# Patient Record
Sex: FEMALE | Race: WHITE | Employment: FULL TIME | URBAN - METROPOLITAN AREA
[De-identification: names, ages, dates, MRNs, and addresses within clinical notes are randomized per-mention and may not be internally consistent; named-entity substitution may affect disease eponyms.]

---

## 2018-05-16 ENCOUNTER — HOSPITAL ENCOUNTER (OUTPATIENT)
Dept: PHYSICAL THERAPY | Age: 58
Discharge: HOME | End: 2018-05-16
Payer: OTHER GOVERNMENT

## 2018-05-16 PROCEDURE — 97140 MANUAL THERAPY 1/> REGIONS: CPT

## 2018-05-16 PROCEDURE — 97162 PT EVAL MOD COMPLEX 30 MIN: CPT

## 2018-05-16 NOTE — PROGRESS NOTES
[address] [facility] [address] [facility] AT [address] [place], [place]  Phone: [phone] Fax: [phone] / [address] [address] PHYSICAL THERAPY SERVICES  Patient Name: Masha Hilliard : 1960   Medical   Diagnosis: Acute postoperative pain of right knee [G89.18, M25.561] Treatment Diagnosis: Acute postoperative pain of right knee [G89.18, M25.561]   Onset Date: DOS:  3/26/2018     Referral Source: Hamida Huerta MD [facility]): 2018   Prior Hospitalization: See medical history Provider #: [id number]   Prior Level of Function: Chronic difficulty/pain with stairs, sleeping, walking due to knee pain; ambulated without AD. Works as realtor full time. Recreational exercises: weight lifting, treadmill, elliptical, bike riding. Comorbidities: asthma   Medications: Verified on Patient Summary List   The Plan of Care and following information is based on the information from the initial evaluation.   ========================================================================  Assessment / key information:  Pt is a 62y.o. year old female with subjective complaints of R knee pain s/p TKA 3/26/2018. Pt received 4 weeks of HHPT, and has had slight delay in referral to outpt PT. Pt states she feels rehab is going well. Reports compliance with HEP. Reports slight numbness to lateral aspect of knee, but denies red flags. Current pain is rated as 2 to 10/10. Current functional limitations: stairs with pain/difficulty descending, walking, standing, unable to return to recreational activities. FOTO score= 46/100 indicating moderate impairment to functional activities.   Today's evaulation is significant for   1) gait mechanics; minimally antalgic RLE with decreased knee extension noted     2) Impaired knee A/PROM: R (-8)/(-7) to 105/110.  3) Impaired strength:  Hip flex R 4/5, L 4+/5; ext R 4-/5; abd R 5-/5; Knee Ext R 5-/5, L 5/5; flex R 4/5    4) Additional findings: mild tightness to ITB/hams/gastroc, moderate tightness to quad. Patellar mobs WNL. Mild edema lateral superior joint. Pt will be a good candidate for skilled PT to address these impairments and promote return to normal ADLs and functional mobility for improved quality of life.    ========================================================================  Eval Complexity: History: MEDIUM  Complexity : 1-2 comorbidities / personal factors will impact the outcome/ POC Exam:MEDIUM Complexity : 3 Standardized tests and measures addressing body structure, function, activity limitation and / or participation in recreation  Presentation: MEDIUM Complexity : Evolving with changing characteristics  Clinical Decision Making:MEDIUM Complexity : FOTO score of 26-74Overall Complexity:MEDIUM  Problem List: pain affecting function, decrease ROM, decrease strength, impaired gait/ balance, decrease ADL/ functional abilitiies, decrease activity tolerance, decrease flexibility/ joint mobility and decrease transfer abilities   Treatment Plan may include any combination of the following: Therapeutic exercise, Therapeutic activities, Neuromuscular re-education, Physical agent/modality, Gait/balance training, Manual therapy, Patient education, Functional mobility training and Stair training  Patient / Family readiness to learn indicated by: asking questions, trying to perform skills and interest  Persons(s) to be included in education: patient (P)  Barriers to Learning/Limitations: None  Measures taken:    Patient Goal (s): \"full extension & flexion of right knee return to normal activities to include long walks, stair climbing, weight lifting, bicycling, eliptical\"\"   Patient self reported health status: good  Rehabilitation Potential: good   Short Term Goals: To be accomplished in  2-3  weeks:  1.   Pt will be educated in appropriate HEP to decrease pain, increase ROM and return pt to PLOF.    2. Pt will increase R knee AROM 0 to 120 in order to promote normalized gait mechanics  3. Pt will improve FOTO score to >/= 56 to demonstrate a significant improvement in functional activity tolerance.  Long Term Goals: To be accomplished in  6-8  weeks:  1. Pt will improve FOTO score to >/= 66 to demo a significant improvement in functional activity tolerance. 2. Patient will demonstrate good eccentric quad control in lateral step down off 6 inch step with no compensation to facilitate normalized gait pattern for stair negotiation. 3. Patient will increase R knee strength in flexion to >/= 4+/5 so patient has improved ability to return to recreational sport activities. Frequency / Duration:   Patient to be seen  2-3  times per week for 6-8  weeks:  Patient / Caregiver education and instruction: activity modification and exercises  G-Codes (GP): n/a  Therapist Signature: Bart Sol, PT Date: 3/32/9246   Certification Period: n/a Time: 7:06 AM   ========================================================================  I certify that the above Physical Therapy Services are being furnished while the patient is under my care. I agree with the treatment plan and certify that this therapy is necessary. Physician Signature:        Date:       Time:   Please sign and return to [facility] or you may fax the signed copy to [phone]. Thank you.

## 2018-05-16 NOTE — PROGRESS NOTES
PT DAILY TREATMENT NOTE     Patient Name: Beck Grajeda  Date:2018  : 1960  [x]  Patient  Verified  Payor: SAI / Plan: Pravin Swain 74 / Product Type:  /    In time: 10:00  Out time: 1040  Total Treatment Time (min): 40  Total Timed Codes (min): 40  1:1 Treatment Time (min): n/a   Visit #: 1     Treatment Area: Acute postoperative pain of right knee [G89.18, M25.561]    SUBJECTIVE  Pain Level (0-10 scale): 2-3  Any medication changes, allergies to medications, adverse drug reactions, diagnosis change, or new procedure performed?: [x] No    [] Yes (see summary sheet for update)  Subjective functional status/changes:   [] No changes reported  See POC    OBJECTIVE    See exam on chart for details on objective findings          5 min Therapeutic Exercise:  [x] See flow sheet ; reviewed current HEP and advised to add stair knee flexion stretch and static sitting extension stretch with use of bag for additional weight/stretch x 5 minutes 2-3 x/day. Rationale: increase ROM and increase strength to improve the patients ability to perform functional mobility/ADLs and attain goals. 15 min Manual Therapy:  IASTM to distal quads, ITB. DTM to distal hamstrings. PROM knee flex/ext. Grade 3-4 mobs for knee extension. Manual quad stretch with CR. Rationale: decrease pain, increase ROM, increase tissue extensibility and decrease trigger points to perform functional mobility/ADLs and attain goals.                min Patient Education: [x] Review HEP    [] Progressed/Changed HEP based on:   [] positioning   [] body mechanics   [] transfers   [x] heat/ice application        Other Objective/Functional Measures:     Pain Level (0-10 scale) post treatment: 3-4    ASSESSMENT/Changes in Function:     Patient will continue to benefit from skilled PT services to modify and progress therapeutic interventions, address functional mobility deficits, address ROM deficits, address strength deficits and analyze and address soft tissue restrictions to attain remaining goals. [x]  See Plan of Care  []  See progress note/recertification  []  See Discharge Summary         Progress towards goals / Updated goals:  See POC    PLAN  [x]  Upgrade activities as tolerated     [x]  Continue plan of care  []  Update interventions per flow sheet       []  Discharge due to:_  []  Other:_      Martina Sol, PT 5/16/2018  7:06 AM

## 2018-05-21 ENCOUNTER — HOSPITAL ENCOUNTER (OUTPATIENT)
Dept: PHYSICAL THERAPY | Age: 58
Discharge: HOME | End: 2018-05-21
Payer: OTHER GOVERNMENT

## 2018-05-21 PROCEDURE — 97110 THERAPEUTIC EXERCISES: CPT

## 2018-05-21 PROCEDURE — 97140 MANUAL THERAPY 1/> REGIONS: CPT

## 2018-05-21 NOTE — PROGRESS NOTES
PT DAILY TREATMENT NOTE     Patient Name: Masha Hilliard  Date:2018  : 1960  [x]  Patient  Verified  Payor:  / Plan: Pravin Swain 74 / Product Type:  /    In time: 8:32  Out time: 9:38  Total Treatment Time (min): 66  Total Timed Codes (min): 51  1:1 Treatment Time (min): n/a   Visit #: 2 of     Treatment Area: Acute postoperative pain of right knee [G89.18, M25.561]    SUBJECTIVE  Pain Level (0-10 scale): 1-2  Any medication changes, allergies to medications, adverse drug reactions, diagnosis change, or new procedure performed?: [x] No    [] Yes (see summary sheet for update)  Subjective functional status/changes:   [] No changes reported  \"I don't know why but it felt really stiff all weekend\"    OBJECTIVE  Modality rationale: decrease inflammation and decrease pain to improve the patients ability to perform functional mobility/ADLs and attain goals.    Min Type Additional Details    [] Estim: []Att   []Unatt        []TENS instruct                  []IFC  []Premod   []NMES                     []Other:  []w/US   []w/ice   []w/heat  Position:  Location:    []  Traction: [] Cervical       []Lumbar                       [] Prone          []Supine                       []Intermittent   []Continuous Lbs:  [] before manual  [] after manual    []  Ultrasound: []Continuous   [] Pulsed                           []1MHz   []3MHz Location:  W/cm2:    []  Iontophoresis with dexamethasone         Location: [] Take home patch   [] In clinic   10 [x]  Ice     []  heat  []  Ice massage Position: reclined  Location: R knee    []  Vasopneumatic Device Pressure:       [] lo [] med [] hi   Temperature: [] lo [] med [] hi   [x] Skin assessment post-treatment:  [x]intact []redness- no adverse reaction       []redness - adverse reaction:       38 min Therapeutic Exercise:  [x] See flow sheet :   Rationale: increase ROM and increase strength to improve the patients ability to perform functional mobility/ADLs and attain goals. 13 min Manual Therapy:  STM to distal quads, ITB. DTM to distal hamstrings. PROM knee flex/ext. Grade 3-4 mobs for knee extension. Rationale:  decrease pain, increase ROM, increase tissue extensibility and decrease trigger points to perform functional mobility/ADLs and attain goals. min Patient Education: [x] Review HEP    [] Progressed/Changed HEP based on:   [] positioning   [] body mechanics   [] transfers   [] heat/ice application        Other Objective/Functional Measures:   -new exercises added as per flow sheet with vc's provided for form/technique 100% of the time. Pain Level (0-10 scale) post treatment:  2-3    ASSESSMENT/Changes in Function: Pt with increased knee A/PROM this session vs. SOC. Pt with minimal muscle guarding during manual, however with cueing able to reduce and achieve good stretching. Pt with no adverse signs/sx's with today's session. VMO preference with exercises to promote restored balance of quads. Patient will continue to benefit from skilled PT services to modify and progress therapeutic interventions, address functional mobility deficits, address ROM deficits, address strength deficits and analyze and address soft tissue restrictions to attain remaining goals. Progress towards goals / Updated goals:  Initiated exercises this session    PLAN  [x]  Upgrade activities as tolerated     [x]  Continue plan of care  []  Update interventions per flow sheet       []  Discharge due to:_  []  Other:_      Michelle Sol, PT 5/21/2018  7:12 AM

## 2018-05-23 ENCOUNTER — HOSPITAL ENCOUNTER (OUTPATIENT)
Dept: PHYSICAL THERAPY | Age: 58
Discharge: HOME | End: 2018-05-23
Payer: OTHER GOVERNMENT

## 2018-05-23 PROCEDURE — 97140 MANUAL THERAPY 1/> REGIONS: CPT

## 2018-05-23 PROCEDURE — 97110 THERAPEUTIC EXERCISES: CPT

## 2018-05-23 PROCEDURE — 97016 VASOPNEUMATIC DEVICE THERAPY: CPT

## 2018-05-23 NOTE — PROGRESS NOTES
PT DAILY TREATMENT NOTE     Patient Name: Aleah Pringle  Date:2018  : 1960  [x]  Patient  Verified  Payor: SAI / Plan: Edgard Dickinson / Product Type:  /    In time: 230  Out time: 344  Total Treatment Time (min): 74  Total Timed Codes (min): 64  1:1 Treatment Time (min): n/a   Visit #: 3 of     Treatment Area: Acute postoperative pain of right knee [G89.18, M25.561]    SUBJECTIVE  Pain Level (0-10 scale): 2-3/10 \"its so tight\"  Any medication changes, allergies to medications, adverse drug reactions, diagnosis change, or new procedure performed?: [x] No    [] Yes (see summary sheet for update)  Subjective functional status/changes:   [] No changes reported  My right knee is swollen and is very tight. I cant wait till its back to normal. Its much better though than it was before surgery. OBJECTIVE  Modality rationale: decrease inflammation and decrease pain to improve the patients ability to perform functional mobility/ADLs and attain goals.    Min Type Additional Details    [] Estim: []Att   []Unatt        []TENS instruct                  []IFC  []Premod   []NMES                     []Other:  []w/US   []w/ice   []w/heat  Position:  Location:    []  Traction: [] Cervical       []Lumbar                       [] Prone          []Supine                       []Intermittent   []Continuous Lbs:  [] before manual  [] after manual    []  Ultrasound: []Continuous   [] Pulsed                           []1MHz   []3MHz Location:  W/cm2:    []  Iontophoresis with dexamethasone         Location: [] Take home patch   [] In clinic    []  Ice     []  heat  []  Ice massage Position: reclined  Location: R knee   10 [x]  Vasopneumatic Device to the right knee Pressure:       [] lo [x] med [] hi   Temperature: [x] lo [] med [] hi   [x] Skin assessment post-treatment:  [x]intact []redness- no adverse reaction       []redness - adverse reaction:       49 min Therapeutic Exercise:  [x] See flow sheet :   Rationale: increase ROM and increase strength to improve the patients ability to perform functional mobility/ADLs and attain goals. 15 min Manual Therapy:  STM to distal quads, ITB, TFL, and TPR to the right popliteus; grade III tibiofemoral mobes into extension and cranial PF glides    Rationale:  decrease pain, increase ROM, increase tissue extensibility and decrease trigger points to perform functional mobility/ADLs and attain goals. min Patient Education: [x] Review HEP    [] Progressed/Changed HEP based on:   [] positioning   [] body mechanics   [] transfers   [] heat/ice application        Other Objective/Functional Measures:   Prior to manual:  Circumferential measures at superior patella poles L 33cm R 35cm  Right knee extension -10 degrees   left knee extension -5 degrees  Active flexion in supine R 105 degrees and L 130 degrees    Pain Level (0-10 scale) post treatment:  1    ASSESSMENT/Changes in Function:   Pt lacking 10 degrees of right knee extension prior to manual therapy and 5 degrees after mobes and stretching. Pt with poor tolerance to prone right knee flexion stretch and with active muscle guarding. Decreased guarding after mobes and TPR of popliteus. Pt able to complete full revolutions on the recumbent bike today without any pain. Patient will continue to benefit from skilled PT services to modify and progress therapeutic interventions, address functional mobility deficits, address ROM deficits, address strength deficits and analyze and address soft tissue restrictions to attain remaining goals. Progress towards goals / Updated goals: · Short Term Goals: To be accomplished in  2-3  weeks:  1. Pt will be educated in appropriate HEP to decrease pain, increase ROM and return pt to PLOF. -PROGRESSING 5/23/18  2. Pt will increase R knee AROM 0 to 120 in order to promote normalized gait mechanics  3.  Pt will improve FOTO score to >/= 56 to demonstrate a significant improvement in functional activity tolerance.     · Long Term Goals: To be accomplished in  6-8  weeks:  1. Pt will improve FOTO score to >/= 66 to demo a significant improvement in functional activity tolerance. 2. Patient will demonstrate good eccentric quad control in lateral step down off 6 inch step with no compensation to facilitate normalized gait pattern for stair negotiation. 3. Patient will increase R knee strength in flexion to >/= 4+/5 so patient has improved ability to return to recreational sport activities.     PLAN  [x]  Upgrade activities as tolerated     [x]  Continue plan of care  []  Update interventions per flow sheet       []  Discharge due to:_  []  Other:_      Musa Carrizales PT 5/23/2018  7:12 AM

## 2018-05-25 ENCOUNTER — HOSPITAL ENCOUNTER (OUTPATIENT)
Dept: PHYSICAL THERAPY | Age: 58
Discharge: HOME | End: 2018-05-25
Payer: OTHER GOVERNMENT

## 2018-05-25 PROCEDURE — 97140 MANUAL THERAPY 1/> REGIONS: CPT

## 2018-05-25 NOTE — PROGRESS NOTES
PT DAILY TREATMENT NOTE     Patient Name: Zahida Barbosa  Date:2018  : 1960  [x]  Patient  Verified  Payor:  / Plan: Pravin Swain 74 / Product Type:  /    In time: 800  Out time: 850  Total Treatment Time (min): 50  Total Timed Codes (min): 30  1:1 Treatment Time (min):   Visit #: 4 of     Treatment Area: Acute postoperative pain of right knee [G89.18, M25.561]    SUBJECTIVE  Pain Level (0-10 scale): 1-2/10 \"its so tight\"  Any medication changes, allergies to medications, adverse drug reactions, diagnosis change, or new procedure performed?: [x] No    [] Yes (see summary sheet for update)  Subjective functional status/changes:   [] No changes reported  My right knee is always tight especially at this time in the morning. OBJECTIVE  Modality rationale: decrease inflammation and decrease pain to improve the patients ability to perform functional mobility/ADLs and attain goals.    Min Type Additional Details    [] Estim: []Att   []Unatt        []TENS instruct                  []IFC  []Premod   []NMES                     []Other:  []w/US   []w/ice   []w/heat  Position:  Location:    []  Traction: [] Cervical       []Lumbar                       [] Prone          []Supine                       []Intermittent   []Continuous Lbs:  [] before manual  [] after manual    []  Ultrasound: []Continuous   [] Pulsed                           []1MHz   []3MHz Location:  W/cm2:    []  Iontophoresis with dexamethasone         Location: [] Take home patch   [] In clinic    []  Ice     []  heat  []  Ice massage Position: reclined  Location: R knee   10 [x]  Vasopneumatic Device to the right knee Pressure:       [] lo [x] med [] hi   Temperature: [x] lo [] med [] hi   [x] Skin assessment post-treatment:  [x]intact []redness- no adverse reaction       []redness - adverse reaction:       (5)  10min untimed bike for Warm up min Therapeutic Exercise:  [x] See flow sheet :   Rationale: increase ROM and increase strength to improve the patients ability to perform functional mobility/ADLs and attain goals. 25 min Manual Therapy:  STM to distal quads, ITB, TFL, and TPR to the right popliteus; grade III tibiofemoral mobes into flexion and caudal and medial PF glides; tibiofemoral distraction EOB with passive stretching into knee flexion   Rationale:  decrease pain, increase ROM, increase tissue extensibility and decrease trigger points to perform functional mobility/ADLs and attain goals. min Patient Education: [x] Review HEP    [] Progressed/Changed HEP based on:   [] positioning   [] body mechanics   [] transfers   [] heat/ice application        Other Objective/Functional Measures:   Prior to manual:  Circumferential measures at superior patella poles L 33cm R 35cm  Right knee extension -10 degrees   left knee extension -5 degrees  Active flexion in supine R 105 degrees and L 130 degrees     Pain Level (0-10 scale) post treatment:  1    ASSESSMENT/Changes in Function:   Pt with poor tolerance to prone right knee flexion stretch and with active muscle guarding. Decreased guarding after mobes and TPR with passive stretching into flexion with joint distraction. Pt able to complete full revolutions on the recumbent bike today as a warm up without pain. Patient will continue to benefit from skilled PT services to modify and progress therapeutic interventions, address functional mobility deficits, address ROM deficits, address strength deficits and analyze and address soft tissue restrictions to attain remaining goals. Progress towards goals / Updated goals: · Short Term Goals: To be accomplished in  2-3  weeks:  1. Pt will be educated in appropriate HEP to decrease pain, increase ROM and return pt to PLOF. -PROGRESSING 5/23/18  2. Pt will increase R knee AROM 0 to 120 in order to promote normalized gait mechanics-PROGRESSING 0-105 in supine 5/25/18  3.  Pt will improve FOTO score to >/= 56 to demonstrate a significant improvement in functional activity tolerance.     · Long Term Goals: To be accomplished in  6-8  weeks:  1. Pt will improve FOTO score to >/= 66 to demo a significant improvement in functional activity tolerance. 2. Patient will demonstrate good eccentric quad control in lateral step down off 6 inch step with no compensation to facilitate normalized gait pattern for stair negotiation. 3. Patient will increase R knee strength in flexion to >/= 4+/5 so patient has improved ability to return to recreational sport activities.     PLAN  [x]  Upgrade activities as tolerated     [x]  Continue plan of care  []  Update interventions per flow sheet       []  Discharge due to:_  []  Other:_      Alexx Herman, PT 5/25/2018  7:12 AM

## 2018-05-29 ENCOUNTER — HOSPITAL ENCOUNTER (OUTPATIENT)
Dept: PHYSICAL THERAPY | Age: 58
Discharge: HOME | End: 2018-05-29
Payer: OTHER GOVERNMENT

## 2018-05-29 PROCEDURE — 97110 THERAPEUTIC EXERCISES: CPT | Performed by: PHYSICAL THERAPIST

## 2018-05-29 PROCEDURE — 97140 MANUAL THERAPY 1/> REGIONS: CPT | Performed by: PHYSICAL THERAPIST

## 2018-05-29 NOTE — PROGRESS NOTES
PT DAILY TREATMENT NOTE     Patient Name: Leandro Hammans  Date:2018  : 1960  [x]  Patient  Verified  Payor:  / Plan: Pravin Swain 74 / Product Type:  /    In time: 302   Out time: 400pm  Total Treatment Time (min): 58  Total Timed Codes (min): 48  1:1 Treatment Time (min):   Visit #: 5 of     Treatment Area: Acute postoperative pain of right knee [G89.18, M25.561]    SUBJECTIVE  Pain Level (0-10 scale): 1-2/10   Any medication changes, allergies to medications, adverse drug reactions, diagnosis change, or new procedure performed?: [x] No    [] Yes (see summary sheet for update)  Subjective functional status/changes:   [] No changes reported  \" I was able to walk over 5 mi on Sat and . \"    OBJECTIVE  Modality rationale: decrease inflammation and decrease pain to improve the patients ability to perform functional mobility/ADLs and attain goals.    Min Type Additional Details    [] Estim: []Att   []Unatt        []TENS instruct                  []IFC  []Premod   []NMES                     []Other:  []w/US   []w/ice   []w/heat  Position:  Location:    []  Traction: [] Cervical       []Lumbar                       [] Prone          []Supine                       []Intermittent   []Continuous Lbs:  [] before manual  [] after manual    []  Ultrasound: []Continuous   [] Pulsed                           []1MHz   []3MHz Location:  W/cm2:    []  Iontophoresis with dexamethasone         Location: [] Take home patch   [] In clinic   10 [x]  Ice     []  heat  []  Ice massage Position: reclined  Location: R knee    [x]  Vasopneumatic Device to the right knee Pressure:       [] lo [x] med [] hi   Temperature: [x] lo [] med [] hi   [x] Skin assessment post-treatment:  [x]intact []redness- no adverse reaction       []redness - adverse reaction:       33 min Therapeutic Exercise:  [x] See flow sheet :   Rationale: increase ROM and increase strength to improve the patients ability to perform functional mobility/ADLs and attain goals. 15 min Manual Therapy:   ITB, lateral HS ; grade III tibiofemoral mobes ,PF glides; jt distraction EOB with passive stretching into knee flexion   Rationale:  decrease pain, increase ROM, increase tissue extensibility and decrease trigger points to perform functional mobility/ADLs and attain goals. x min Patient Education: [x] Review HEP    [] Progressed/Changed HEP based on:   [] positioning   [x] body mechanics   [] transfers   [] heat/ice application        Other Objective/Functional Measures:   R knee AROM/PROM: 116/120deg after manual, good form noted with eccentric step downs from 6 in. Instructed patient in TKE stretches with weights for prolonged stretch at home     Pain Level (0-10 scale) post treatment:  2    ASSESSMENT/Changes in Function:   Pt with poor tolerance to prone right knee flexion stretch and with active muscle guarding. Decreased guarding after mobes and TPR with passive stretching into flexion with joint distraction. Pt able to complete full revolutions on the recumbent bike today as a warm up without pain. Patient will continue to benefit from skilled PT services to modify and progress therapeutic interventions, address functional mobility deficits, address ROM deficits, address strength deficits and analyze and address soft tissue restrictions to attain remaining goals. Progress towards goals / Updated goals: · Short Term Goals: To be accomplished in  2-3  weeks:  1. Pt will be educated in appropriate HEP to decrease pain, increase ROM and return pt to PLOF. -PROGRESSING 5/23/18  2. Pt will increase R knee AROM 0 to 120 in order to promote normalized gait mechanics-PROGRESSING 0-105 in supine 5/25/18  3. Pt will improve FOTO score to >/= 56 to demonstrate a significant improvement in functional activity tolerance.     · Long Term Goals: To be accomplished in  6-8  weeks:  1.  Pt will improve FOTO score to >/= 66 to demo a significant improvement in functional activity tolerance. 2. Patient will demonstrate good eccentric quad control in lateral step down off 6 inch step with no compensation to facilitate normalized gait pattern for stair negotiation. Progressing able to do with good form today. 5-29-18  3. Patient will increase R knee strength in flexion to >/= 4+/5 so patient has improved ability to return to recreational sport activities.     PLAN  [x]  Upgrade activities as tolerated     [x]  Continue plan of care  []  Update interventions per flow sheet       []  Discharge due to:_  []  Other:_      Ashu Aviles, RADHA 5/29/2018  7:12 AM

## 2018-05-30 ENCOUNTER — HOSPITAL ENCOUNTER (OUTPATIENT)
Dept: PHYSICAL THERAPY | Age: 58
Discharge: HOME | End: 2018-05-30
Payer: OTHER GOVERNMENT

## 2018-05-30 PROCEDURE — 97140 MANUAL THERAPY 1/> REGIONS: CPT

## 2018-05-30 PROCEDURE — 97110 THERAPEUTIC EXERCISES: CPT

## 2018-06-01 ENCOUNTER — HOSPITAL ENCOUNTER (OUTPATIENT)
Dept: PHYSICAL THERAPY | Age: 58
Discharge: HOME | End: 2018-06-01
Payer: OTHER GOVERNMENT

## 2018-06-01 PROCEDURE — 97110 THERAPEUTIC EXERCISES: CPT

## 2018-06-01 PROCEDURE — 97140 MANUAL THERAPY 1/> REGIONS: CPT

## 2018-06-01 NOTE — PROGRESS NOTES
PT DAILY TREATMENT NOTE     Patient Name: Beck Grajeda  Date:2018  : 1960  [x]  Patient  Verified  Payor: SAI / Plan: Pravin Swain 74 / Product Type:  /    In time: 10:26   Out time: 11:36  Total Treatment Time (min): 70  Total Timed Codes (min): 55  1:1 Treatment Time (min):   Visit #: 7     Treatment Area: Acute postoperative pain of right knee [G89.18, M25.561]    SUBJECTIVE  Pain Level (0-10 scale): 2-310   Any medication changes, allergies to medications, adverse drug reactions, diagnosis change, or new procedure performed?: [x] No    [] Yes (see summary sheet for update)  Subjective functional status/changes:   [] No changes reported  \"I really liked what we did the other day. I've been having my  help me stretch my knees straight\". OBJECTIVE  Modality rationale: decrease inflammation and decrease pain to improve the patients ability to perform functional mobility/ADLs and attain goals.    Min Type Additional Details    [] Estim: []Att   []Unatt        []TENS instruct                  []IFC  []Premod   []NMES                     []Other:  []w/US   []w/ice   []w/heat  Position:  Location:    []  Traction: [] Cervical       []Lumbar                       [] Prone          []Supine                       []Intermittent   []Continuous Lbs:  [] before manual  [] after manual    []  Ultrasound: []Continuous   [] Pulsed                           []1MHz   []3MHz Location:  W/cm2:    []  Iontophoresis with dexamethasone         Location: [] Take home patch   [] In clinic   10 [x]  Ice     []  heat  []  Ice massage Position: reclined  Location: R knee    []  Vasopneumatic Device to the right knee Pressure:       [] lo [] med [] hi   Temperature: [] lo [] med [] hi   [x] Skin assessment post-treatment:  [x]intact []redness- no adverse reaction       []redness - adverse reaction:       40 min Therapeutic Exercise:  [x] See flow sheet :   Rationale: increase ROM and increase strength to improve the patients ability to perform functional mobility/ADLs and attain goals. 15 min Manual Therapy:   DTM lateral HS ; grade III tibiofemoral mobes, PF glides; PROM knee flex/ext   Rationale:  decrease pain, increase ROM, increase tissue extensibility and decrease trigger points to perform functional mobility/ADLs and attain goals. x min Patient Education: [x] Review HEP    [] Progressed/Changed HEP based on:   [] positioning   [x] body mechanics   [] transfers   [] heat/ice application        Other Objective/Functional Measures:   -pt requests d/c FR ITB due to difficult for upper body strength; attempted alternate ITB stretches, however pt unable to find a good stretch  -added H/T raises; TKE      Pain Level (0-10 scale) post treatment:  1-2    ASSESSMENT/Changes in Function:   Continues to lack full extension and ed on need for continued HEP stretches to promote normalized gait and stability. Pt c/o crepitus with loading phase of gait; after TKE and cues for increased quad activation crepitus abolished. Patient will continue to benefit from skilled PT services to modify and progress therapeutic interventions, address functional mobility deficits, address ROM deficits, address strength deficits and analyze and address soft tissue restrictions to attain remaining goals. Progress towards goals / Updated goals: · Short Term Goals: To be accomplished in  2-3  weeks:  1. Pt will be educated in appropriate HEP to decrease pain, increase ROM and return pt to PLOF. -PROGRESSING 5/23/18  2. Pt will increase R knee AROM 0 to 120 in order to promote normalized gait mechanics-PROGRESSING 0-105 in supine 5/25/18  3. Pt will improve FOTO score to >/= 56 to demonstrate a significant improvement in functional activity tolerance.     · Long Term Goals: To be accomplished in  6-8  weeks:  1.  Pt will improve FOTO score to >/= 66 to demo a significant improvement in functional activity tolerance. 2. Patient will demonstrate good eccentric quad control in lateral step down off 6 inch step with no compensation to facilitate normalized gait pattern for stair negotiation. Progressing able to do with good form today. 5-29-18  3. Patient will increase R knee strength in flexion to >/= 4+/5 so patient has improved ability to return to recreational sport activities. PLAN  [x]  Upgrade activities as tolerated     [x]  Continue plan of care  []  Update interventions per flow sheet       []  Discharge due to:_  []  Other:_      Mary Kay Sol, PT 6/1/2018  7:12 AM

## 2018-06-04 ENCOUNTER — HOSPITAL ENCOUNTER (OUTPATIENT)
Dept: PHYSICAL THERAPY | Age: 58
Discharge: HOME | End: 2018-06-04
Payer: OTHER GOVERNMENT

## 2018-06-04 PROCEDURE — 97110 THERAPEUTIC EXERCISES: CPT

## 2018-06-04 PROCEDURE — 97140 MANUAL THERAPY 1/> REGIONS: CPT

## 2018-06-04 NOTE — PROGRESS NOTES
PT DAILY TREATMENT NOTE     Patient Name: Annabel Magaña  Date:2018  : 1960  [x]  Patient  Verified  Payor:  / Plan: Tawny Alexandra / Product Type:  /    In time: 1:00   Out time: 2:07 pm  Total Treatment Time (min): 67  Total Timed Codes (min): 52  1:1 Treatment Time (min):   Visit #: 8 of     Treatment Area: Acute postoperative pain of right knee [G89.18, M25.561]    SUBJECTIVE  Pain Level (0-10 scale): 2/10   Any medication changes, allergies to medications, adverse drug reactions, diagnosis change, or new procedure performed?: [x] No    [] Yes (see summary sheet for update)  Subjective functional status/changes:   [] No changes reported  \"The knee feels stiff. I didn't sleep well last night so I don't feel great\"    OBJECTIVE  Modality rationale: decrease inflammation and decrease pain to improve the patients ability to perform functional mobility/ADLs and attain goals.    Min Type Additional Details    [] Estim: []Att   []Unatt        []TENS instruct                  []IFC  []Premod   []NMES                     []Other:  []w/US   []w/ice   []w/heat  Position:  Location:    []  Traction: [] Cervical       []Lumbar                       [] Prone          []Supine                       []Intermittent   []Continuous Lbs:  [] before manual  [] after manual    []  Ultrasound: []Continuous   [] Pulsed                           []1MHz   []3MHz Location:  W/cm2:    []  Iontophoresis with dexamethasone         Location: [] Take home patch   [] In clinic   10 [x]  Ice     []  heat  []  Ice massage Position: reclined  Location: R knee    []  Vasopneumatic Device to the right knee Pressure:       [] lo [] med [] hi   Temperature: [] lo [] med [] hi   [x] Skin assessment post-treatment:  [x]intact []redness- no adverse reaction       []redness - adverse reaction:       38 min Therapeutic Exercise:  [x] See flow sheet (-5 min warm up bike)   Rationale: increase ROM and increase strength to improve the patients ability to perform functional mobility/ADLs and attain goals. 14 min Manual Therapy:   IASTM to VL, ITB, quad tendon and RF.  DTM lateral HS; grade III tibiofemoral mobes, PF glides; PROM knee flex/ext   Rationale:  decrease pain, increase ROM, increase tissue extensibility and decrease trigger points to perform functional mobility/ADLs and attain goals. x min Patient Education: [x] Review HEP    [] Progressed/Changed HEP based on:   [] positioning   [x] body mechanics   [] transfers   [] heat/ice application        Other Objective/Functional Measures:   -increased reps with SAQ  Knee A/PROM post manual and exercises:  (-7)/(-5) to 115/118    Pain Level (0-10 scale) post treatment:  1    ASSESSMENT/Changes in Function:   Slight decrease in knee extension ROM this session vs. 5/30/18; pt advised to expect variation from day to day and to continue with HEP stretching daily for overall trend and progression towards goals. Pt demonstrates good knee stability with BOSU step ups. Min/moderate fatigue reported towards end of reps all exercises indicating appropriate intensity. Patient will continue to benefit from skilled PT services to modify and progress therapeutic interventions, address functional mobility deficits, address ROM deficits, address strength deficits and analyze and address soft tissue restrictions to attain remaining goals. Progress towards goals / Updated goals: · Short Term Goals: To be accomplished in  2-3  weeks:  1. Pt will be educated in appropriate HEP to decrease pain, increase ROM and return pt to PLOF. -6/4: Goal met and ongoing  2. Pt will increase R knee AROM 0 to 120 in order to promote normalized gait mechanics-PROGRESSING (-7)/(-5) to 115/118 in supine 6/4/18  3. Pt will improve FOTO score to >/= 56 to demonstrate a significant improvement in functional activity tolerance.     · Long Term Goals:  To be accomplished in 6-8  weeks:  1. Pt will improve FOTO score to >/= 66 to demo a significant improvement in functional activity tolerance. 2. Patient will demonstrate good eccentric quad control in lateral step down off 6 inch step with no compensation to facilitate normalized gait pattern for stair negotiation. -6/4: Goal in progress; minimal jairo-pelvic drop during lateral tap down. 3. Patient will increase R knee strength in flexion to >/= 4+/5 so patient has improved ability to return to recreational sport activities. PLAN  [x]  Upgrade activities as tolerated     [x]  Continue plan of care  []  Update interventions per flow sheet       []  Discharge due to:_  []  Other:_      Grady Sol, PT 6/4/2018  7:12 AM

## 2018-06-06 ENCOUNTER — APPOINTMENT (OUTPATIENT)
Dept: PHYSICAL THERAPY | Age: 58
End: 2018-06-06
Payer: OTHER GOVERNMENT

## 2018-06-08 ENCOUNTER — HOSPITAL ENCOUNTER (OUTPATIENT)
Dept: PHYSICAL THERAPY | Age: 58
End: 2018-06-08
Payer: OTHER GOVERNMENT

## 2018-06-11 ENCOUNTER — HOSPITAL ENCOUNTER (OUTPATIENT)
Dept: PHYSICAL THERAPY | Age: 58
Discharge: HOME | End: 2018-06-11
Payer: OTHER GOVERNMENT

## 2018-06-11 PROCEDURE — 97140 MANUAL THERAPY 1/> REGIONS: CPT

## 2018-06-11 PROCEDURE — 97110 THERAPEUTIC EXERCISES: CPT

## 2018-06-11 NOTE — PROGRESS NOTES
PT DAILY TREATMENT NOTE     Patient Name: Teofilo Pierce  Date:2018  : 1960  [x]  Patient  Verified  Payor:  / Plan: Pravin Swain 74 / Product Type:  /    In time:   Out time: 111  Total Treatment Time (min): 79  Visit #: 9     Treatment Area: Acute postoperative pain of right knee [G89.18, M25.561]    SUBJECTIVE  Pain Level (0-10 scale): 210   Any medication changes, allergies to medications, adverse drug reactions, diagnosis change, or new procedure performed?: [x] No    [] Yes (see summary sheet for update)  Subjective functional status/changes:   [] No changes reported  \"I still can't get that full extension, but I feel like its getting better. \"    OBJECTIVE  Modality rationale: decrease inflammation and decrease pain to improve the patients ability to perform functional mobility/ADLs and attain goals.    Min Type Additional Details    [] Estim: []Att   []Unatt        []TENS instruct                  []IFC  []Premod   []NMES                     []Other:  []w/US   []w/ice   []w/heat  Position:  Location:    []  Traction: [] Cervical       []Lumbar                       [] Prone          []Supine                       []Intermittent   []Continuous Lbs:  [] before manual  [] after manual    []  Ultrasound: []Continuous   [] Pulsed                           []1MHz   []3MHz Location:  W/cm2:    []  Iontophoresis with dexamethasone         Location: [] Take home patch   [] In clinic   10 [x]  Ice     []  heat  []  Ice massage Position: reclined  Location: R knee    []  Vasopneumatic Device to the right knee Pressure:       [] lo [] med [] hi   Temperature: [] lo [] med [] hi   [x] Skin assessment post-treatment:  [x]intact []redness- no adverse reaction       []redness - adverse reaction:       47 min Therapeutic Exercise:  [x] See flow sheet   Rationale: increase ROM and increase strength to improve the patients ability to perform functional mobility/ADLs and attain goals. 10 min Manual Therapy:   Grade 2-3 joint mobs for flexion and extension, extension OP , PROM knee flexion and extension    Rationale:  decrease pain, increase ROM, increase tissue extensibility and decrease trigger points to perform functional mobility/ADLs and attain goals. x min Patient Education: [x] Review HEP - added prone extension      Other Objective/Functional Measures:   Knee AROM extension 3 deg    Added prone extension hang with #3    Pain Level (0-10 scale) post treatment:  1    ASSESSMENT/Changes in Function:   Patient educated on assessment coming up on Friday as she is concerned with continuation to get full knee extension . PT was able to achieve full knee extension OP indicating that joint mobility is available but tissue tension present limits ROM. Patient only tolerated prone extension hang for 2.5 min before needing to rest.     Patient will continue to benefit from skilled PT services to modify and progress therapeutic interventions, address functional mobility deficits, address ROM deficits, address strength deficits and analyze and address soft tissue restrictions to attain remaining goals. Progress towards goals / Updated goals: All goals reviewed and progressing appropriately as of 6/11/2018     · Short Term Goals: To be accomplished in  2-3  weeks:  1. Pt will be educated in appropriate HEP to decrease pain, increase ROM and return pt to PLOF. -6/4: Goal met and ongoing  2. Pt will increase R knee AROM 0 to 120 in order to promote normalized gait mechanics-PROGRESSING (-7)/(-5) to 115/118 in supine 6/4/18  3. Pt will improve FOTO score to >/= 56 to demonstrate a significant improvement in functional activity tolerance.     · Long Term Goals: To be accomplished in  6-8  weeks:  1. Pt will improve FOTO score to >/= 66 to demo a significant improvement in functional activity tolerance.   2. Patient will demonstrate good eccentric quad control in lateral step down off 6 inch step with no compensation to facilitate normalized gait pattern for stair negotiation. -6/4: Goal in progress; minimal jiaro-pelvic drop during lateral tap down. 3. Patient will increase R knee strength in flexion to >/= 4+/5 so patient has improved ability to return to recreational sport activities.     PLAN  [x]  Upgrade activities as tolerated     [x]  Continue plan of care  []  Update interventions per flow sheet       []  Discharge due to:_  [x]  Other:_ PN due Friday    Add retro MERCEDES Curry, PT 6/11/2018  7:12 AM

## 2018-06-12 ENCOUNTER — HOSPITAL ENCOUNTER (OUTPATIENT)
Dept: PHYSICAL THERAPY | Age: 58
Discharge: HOME | End: 2018-06-12
Payer: OTHER GOVERNMENT

## 2018-06-12 PROCEDURE — 97110 THERAPEUTIC EXERCISES: CPT | Performed by: PHYSICAL THERAPIST

## 2018-06-12 PROCEDURE — 97140 MANUAL THERAPY 1/> REGIONS: CPT | Performed by: PHYSICAL THERAPIST

## 2018-06-13 ENCOUNTER — APPOINTMENT (OUTPATIENT)
Dept: PHYSICAL THERAPY | Age: 58
End: 2018-06-13
Payer: OTHER GOVERNMENT

## 2018-06-15 ENCOUNTER — HOSPITAL ENCOUNTER (OUTPATIENT)
Dept: PHYSICAL THERAPY | Age: 58
Discharge: HOME | End: 2018-06-15
Payer: OTHER GOVERNMENT

## 2018-06-15 PROCEDURE — 97140 MANUAL THERAPY 1/> REGIONS: CPT

## 2018-06-15 PROCEDURE — 97110 THERAPEUTIC EXERCISES: CPT

## 2018-06-15 NOTE — PROGRESS NOTES
[address] [facility] [address] [facility] AT [address] [place], [place]  Phone: [phone] Fax: [phone]  PROGRESS NOTE  Patient Name: Bhavik Rojas : 1960   Treatment/Medical Diagnosis: Acute postoperative pain of right knee [G89.18, M25.561]   Referral Source: Diana Ariza MD     Date of Initial Visit: 18 Attended Visits: 11 Missed Visits: 0     SUMMARY OF TREATMENT  Pt is a 62y.o. year old female with subjective complaints of R knee pain s/p TKA 3/26/2018. Treatment has included progressive therex, aggressive manual to patients tolerance for joint mobility and ice. CURRENT STATUS  Patient progressing well overall, however continues to have AROM extension deficits that is concerning patient. Patient has full PROM extension indicating available joint mobility, but soft tissue limitation. Other assessment as follows:  Pain at best 1/10, at worst 8/10at night   Subjective % improvement 50%  Objective:    AROM 2- 112 pre manual , 0-116 post manual    Knee strength flexion 4/5, extension 4+/5   Hip strength flexion 4+, ABD 4+, Extension 4+,   Edema R 32, L 31.5   Gait improved - decreased heel strike sec to lack of TKE   Improvements: amb 5 miles without AD, no locking with sit to stand transfers, stamina and spirit improved, stair negotiation : step over step without AD   Deficits pain at night, am stiffness, bicycling, elliptical, LE wt lifting, running for safety      Progress towards goals / Updated goals:       · Short Term Goals: To be accomplished in  2-3  weeks:  1.  Pt will be educated in appropriate HEP to decrease pain, increase ROM and return pt to PLOF. -goal met/ progressing with tolerance   2. Pt will increase R knee AROM 0 to 120 in order to promote normalized gait mechanics goal progressing/ improved to 2-112      · Long Term Goals: To be accomplished in  6-8  weeks:  1.  Pt will improve FOTO score to >/= 66 to demo a significant improvement in functional activity tolerance.goal near met at 62/100  2. Patient will demonstrate good eccentric quad control in lateral step down off 6 inch step with no compensation to facilitate normalized gait pattern for stair negotiation. Goal progressing - improved exx control and patient able to take stairs at home step over step   3. Patient will increase R knee strength in flexion to >/= 4+/5 so patient has improved ability to return to recreational sport activities. Goal progressed to 4/5    New Goals to be achieved in __8-12__  treatments:  1. Pt will improve FOTO score to >/= 66 to demo a significant improvement in functional activity tolerance   2. Patient will demo full knee extension to 0 deg/ B symmetrical for improved gait quality    3. Patient will demo 5/5 knee flexion strength for progression to prior level of function    4. Patient will demo AROM knee flexion to 120 for improved transfers      G-Codes: NA  RECOMMENDATIONS  Patient would benefit from continued skilled PT to address above deficits in ROM, strength and function. Cont 2-3x for 8-12 sessions or as insurance allows. If you have any questions/comments please contact us directly at [phone]   Thank you for allowing us to assist in the care of your patient. Therapist Signature: Haley Hernandez PT Date: 6/15/2018     Time: 1135am    NOTE TO PHYSICIAN:  PLEASE COMPLETE THE ORDERS BELOW AND FAX TO   [facility]: [phone].   If you are unable to process this request in 24 hours please contact our office: [phone].  ___ I have read the above report and request that my patient continue as recommended.   ___ I have read the above report and request that my patient continue therapy with the following changes/special instructions:_________________________________________________________   ___ I have read the above report and request that my patient be discharged from therapy.      Physician Signature:        Date:       Time:

## 2018-06-15 NOTE — PROGRESS NOTES
PT DAILY TREATMENT NOTE     Patient Name: Janes Sinclair  Date:6/15/2018  : 1960  [x]  Patient  Verified  Payor: SAI / Plan: Pravin Swain 74 / Product Type:  /    In time: 1701 Out time: 9240  Total Treatment Time (min): 84  Visit #:     Treatment Area: Acute postoperative pain of right knee [G89.18, M25.561]    SUBJECTIVE  Pain Level (0-10 scale): 2-3/10   Any medication changes, allergies to medications, adverse drug reactions, diagnosis change, or new procedure performed?: [x] No    [] Yes (see summary sheet for update)  Subjective functional status/changes:   [] No changes reported  \"My knee was doing funny things last night \"    OBJECTIVE  Modality rationale: decrease inflammation and decrease pain to improve the patients ability to perform functional mobility/ADLs and attain goals.    Min Type Additional Details    [] Estim: []Att   []Unatt        []TENS instruct                  []IFC  []Premod   []NMES                     []Other:  []w/US   []w/ice   []w/heat  Position:  Location:    []  Traction: [] Cervical       []Lumbar                       [] Prone          []Supine                       []Intermittent   []Continuous Lbs:  [] before manual  [] after manual    []  Ultrasound: []Continuous   [] Pulsed                           []1MHz   []3MHz Location:  W/cm2:    []  Iontophoresis with dexamethasone         Location: [] Take home patch   [] In clinic   10 [x]  Ice ant     [x]  Heat posterior   []  Ice massage Position: reclined  Location: R knee    []  Vasopneumatic Device to the right knee Pressure:       [] lo [] med [] hi   Temperature: [] lo [] med [] hi   [x] Skin assessment post-treatment:  [x]intact []redness- no adverse reaction       []redness - adverse reaction:       59 min Therapeutic Exercise:  [x] See flow sheet: Reassess - including FOTO and reassessment while on nustep/ TM    Rationale: increase ROM and increase strength to improve the patients ability to perform functional mobility/ADLs and attain goals. 15 min Manual Therapy:   Reassess Grade 3 joint mobs for  Extension in Supine, extension OP , PROM knee flexion and extension, manual HS stretch    Rationale:  decrease pain, increase ROM, increase tissue extensibility and decrease trigger points to perform functional mobility/ADLs and attain goals. x min Patient Education: [x] Review HEP     Other Objective/Functional Measures:   Goals assessed for PN   Pain at best 1/10, at worst 8/10at night   Subjective % improvement 50%  Objective:    AROM 2- 112 pre manual , 0-116 post manual    Knee strength flexion 4/5, extension 4+/5   Hip strength flexion 4+, ABD 4+, Extension 4+,   Edema R 32, L 31.5   Gait improved - decreased heel strike sec to lack of TKE   Improvements: amb 5 miles without AD, no locking with sit to stand transfers, stamina and spirit improved, stair negotiation : step over step without AD   Deficits pain at night, am stiffness, bicycling, elliptical, LE wt lifting, running for safety        Pain Level (0-10 scale) post treatment:  1    ASSESSMENT/Changes in Function:   SEE PN     Patient will continue to benefit from skilled PT services to modify and progress therapeutic interventions, address functional mobility deficits, address ROM deficits, address strength deficits and analyze and address soft tissue restrictions to attain remaining goals. Progress towards goals / Updated goals: · Short Term Goals: To be accomplished in  2-3  weeks:  1. Pt will be educated in appropriate HEP to decrease pain, increase ROM and return pt to PLOF. -goal met/ progressing with tolerance   2. Pt will increase R knee AROM 0 to 120 in order to promote normalized gait mechanics goal progressing/ improved to 2-112     · Long Term Goals: To be accomplished in  6-8  weeks:  1.  Pt will improve FOTO score to >/= 66 to demo a significant improvement in functional activity tolerance.goal near met at 62/100  2. Patient will demonstrate good eccentric quad control in lateral step down off 6 inch step with no compensation to facilitate normalized gait pattern for stair negotiation. Goal progressing - improved exx control and patient able to take stairs at home step over step   3. Patient will increase R knee strength in flexion to >/= 4+/5 so patient has improved ability to return to recreational sport activities.  Goal progressed to 4/5      PLAN  [x]  Upgrade activities as tolerated     [x]  Continue plan of care  []  Update interventions per flow sheet       []  Discharge due to:_  [x]  Other:_ Cont per PN  Add taping for edema   Add TB HS curls and stool pull  for LTG          Sami Valle, PT 6/15/2018  7:12 AM

## 2018-06-18 ENCOUNTER — HOSPITAL ENCOUNTER (OUTPATIENT)
Dept: PHYSICAL THERAPY | Age: 58
Discharge: HOME | End: 2018-06-18
Payer: OTHER GOVERNMENT

## 2018-06-18 PROCEDURE — 97140 MANUAL THERAPY 1/> REGIONS: CPT

## 2018-06-18 PROCEDURE — 97110 THERAPEUTIC EXERCISES: CPT

## 2018-06-18 NOTE — PROGRESS NOTES
PT DAILY TREATMENT NOTE     Patient Name: Bhavik Rojas  Date:2018  : 1960  [x]  Patient  Verified  Payor:  / Plan: Pravin Swain 74 / Product Type:  /    In time: 930 Out time: 9877  Total Treatment Time (min): 87  Visit #: 1 of     Treatment Area: Acute postoperative pain of right knee [G89.18, M25.561]    SUBJECTIVE  Pain Level (0-10 scale):2-310   Any medication changes, allergies to medications, adverse drug reactions, diagnosis change, or new procedure performed?: [x] No    [] Yes (see summary sheet for update)  Subjective functional status/changes:   [] No changes reported  \"Its very tight across the top of the knee still. \"    OBJECTIVE  Modality rationale: decrease inflammation and decrease pain to improve the patients ability to perform functional mobility/ADLs and attain goals.    Min Type Additional Details    [] Estim: []Att   []Unatt        []TENS instruct                  []IFC  []Premod   []NMES                     []Other:  []w/US   []w/ice   []w/heat  Position:  Location:    []  Traction: [] Cervical       []Lumbar                       [] Prone          []Supine                       []Intermittent   []Continuous Lbs:  [] before manual  [] after manual    []  Ultrasound: []Continuous   [] Pulsed                           []1MHz   []3MHz Location:  W/cm2:    []  Iontophoresis with dexamethasone         Location: [] Take home patch   [] In clinic   10 [x]  Ice ant     [x]  Heat posterior   []  Ice massage Position: reclined  Location: R knee    []  Vasopneumatic Device to the right knee Pressure:       [] lo [] med [] hi   Temperature: [] lo [] med [] hi   [x] Skin assessment post-treatment:  [x]intact []redness- no adverse reaction       []redness - adverse reaction:       65 min Therapeutic Exercise:  [x] See flow sheet:     Rationale: increase ROM and increase strength to improve the patients ability to perform functional mobility/ADLs and attain goals.    12 min Manual Therapy:    Grade 3 joint mobs for  Extension in Supine, extension OP , PROM knee flexion and extension, manual HS stretch , taping for edema    Rationale:  decrease pain, increase ROM, increase tissue extensibility and decrease trigger points to perform functional mobility/ADLs and attain goals. x min Patient Education: [x] Review HEP     Other Objective/Functional Measures: Added TB HS curls and stool pull for posterior chain strengthening       Pain Level (0-10 scale) post treatment:  1    ASSESSMENT/Changes in Function:   Patient tolerated treatment progression well. Significant improvement in tolerance to prone extension hang. Educated on indications for taping and removal of tape if necessary. Patient will be on vacation next week , but will do HEP while away. Improved gait upon DC      Patient will continue to benefit from skilled PT services to modify and progress therapeutic interventions, address functional mobility deficits, address ROM deficits, address strength deficits and analyze and address soft tissue restrictions to attain remaining goals. Progress towards goals / Updated goals:      New Goals to be achieved in __8-12__  treatments: PN complete last session - cont per updated goals 6/18/18  1. Pt will improve FOTO score to >/= 66 to demo a significant improvement in functional activity tolerance   2. Patient will demo full knee extension to 0 deg/ B symmetrical for improved gait quality    3. Patient will demo 5/5 knee flexion strength for progression to prior level of function    4.   Patient will demo AROM knee flexion to 120 for improved transfers         PLAN  [x]  Upgrade activities as tolerated     [x]  Continue plan of care  []  Update interventions per flow sheet       []  Discharge due to:_  [x]  Other:_Assess response to taping          Demario Crodero, PT 6/18/2018

## 2018-06-22 ENCOUNTER — HOSPITAL ENCOUNTER (OUTPATIENT)
Dept: PHYSICAL THERAPY | Age: 58
Discharge: HOME | End: 2018-06-22
Payer: OTHER GOVERNMENT

## 2018-06-22 PROCEDURE — 97140 MANUAL THERAPY 1/> REGIONS: CPT

## 2018-06-22 PROCEDURE — 97110 THERAPEUTIC EXERCISES: CPT

## 2018-06-22 NOTE — PROGRESS NOTES
PT DAILY TREATMENT NOTE     Patient Name: Abhijit Israel  Date:2018  : 1960  [x]  Patient  Verified  Payor: SAI / Plan: Pravin Swain 74 / Product Type:  /    In time: 8380 Out time: 8637  Total Treatment Time (min): 79  Visit #: 2 of     Treatment Area: Acute postoperative pain of right knee [G89.18, M25.561]    SUBJECTIVE  Pain Level (0-10 scale):1-2/10   Any medication changes, allergies to medications, adverse drug reactions, diagnosis change, or new procedure performed?: [x] No    [] Yes (see summary sheet for update)  Subjective functional status/changes:   [] No changes reported  \"I took the tape off yesterday. I didn't feel as tight when I was going up and down the stairs, but it still looks swollen. \"    OBJECTIVE  Modality rationale: decrease inflammation and decrease pain to improve the patients ability to perform functional mobility/ADLs and attain goals.    Min Type Additional Details    [] Estim: []Att   []Unatt        []TENS instruct                  []IFC  []Premod   []NMES                     []Other:  []w/US   []w/ice   []w/heat  Position:  Location:    []  Traction: [] Cervical       []Lumbar                       [] Prone          []Supine                       []Intermittent   []Continuous Lbs:  [] before manual  [] after manual    []  Ultrasound: []Continuous   [] Pulsed                           []1MHz   []3MHz Location:  W/cm2:    []  Iontophoresis with dexamethasone         Location: [] Take home patch   [] In clinic   10 [x]  Ice ant     [x]  Heat posterior   []  Ice massage Position: reclined  Location: R knee    []  Vasopneumatic Device to the right knee Pressure:       [] lo [] med [] hi   Temperature: [] lo [] med [] hi   [x] Skin assessment post-treatment:  [x]intact []redness- no adverse reaction       []redness - adverse reaction:       56 min Therapeutic Exercise:  [x] See flow sheet:  3 units charged sec to mod I    Rationale: increase ROM and increase strength to improve the patients ability to perform functional mobility/ADLs and attain goals. 13 min Manual Therapy:    Extension OP, prone roller to HS and GSC, prone knee flexion/ quad stretch     Rationale:  decrease pain, increase ROM, increase tissue extensibility and decrease trigger points to perform functional mobility/ADLs and attain goals. x min Patient Education: [x] Review HEP- prone quad stretch      Other Objective/Functional Measures:    AROM 0-114 post manual    Stair negotiation - step over step    Pain Level (0-10 scale) post treatment:  1-2    ASSESSMENT/Changes in Function:   Patient report taping aiding in decreased tightness with stair negotiation. Held taping today d/t patient going on vacation and mild skin irritation. Patient able to achieve B symm with knee extension in supine. Knee flexion AROM limited by pain and hard end feel. DC step up and tap down sec to functional stair negotiation at home. Patient will continue to benefit from skilled PT services to modify and progress therapeutic interventions, address functional mobility deficits, address ROM deficits, address strength deficits and analyze and address soft tissue restrictions to attain remaining goals. Progress towards goals / Updated goals:      New Goals to be achieved in __8-12__  treatments:   1. Pt will improve FOTO score to >/= 66 to demo a significant improvement in functional activity tolerance   2. Patient will demo full knee extension to 0 deg/ B symmetrical for improved gait quality goal met at B symmetrical in supine/ 0 deg  6/22/18   3. Patient will demo 5/5 knee flexion strength for progression to prior level of function    4.   Patient will demo AROM knee flexion to 120 for improved transfers         PLAN  [x]  Upgrade activities as tolerated     [x]  Continue plan of care  []  Update interventions per flow sheet       []  Discharge due to:_  [x]  Other:_patient will return after vacation next week - patient to schedule more apts per  [address] Zana Best, PT 6/22/2018

## 2018-07-02 ENCOUNTER — HOSPITAL ENCOUNTER (OUTPATIENT)
Dept: PHYSICAL THERAPY | Age: 58
Discharge: HOME | End: 2018-07-02
Payer: OTHER GOVERNMENT

## 2018-07-02 PROCEDURE — 97140 MANUAL THERAPY 1/> REGIONS: CPT

## 2018-07-02 PROCEDURE — 97110 THERAPEUTIC EXERCISES: CPT

## 2018-07-02 NOTE — PROGRESS NOTES
[address] [facility] [address] [place], [place]  Phone: [phone] Fax: [phone]  PROGRESS NOTE  Patient Name: Ilana Cool : 1960   Treatment/Medical Diagnosis: Acute postoperative pain of right knee [G89.18, M25.561]   Referral Source: Oneyda Holbrook MD     Date of Initial Visit: 18 Attended Visits: 14 Missed Visits: 0     SUMMARY OF TREATMENT  Pt is a 62y.o. year old female with subjective complaints of R knee pain s/p TKA 3/26/2018. Treatment has included progressive therex, aggressive manual to patient's tolerance for joint mobility and ice. CURRENT STATUS  Patient progressing well overall, however continues to have AROM extension deficits that is concerning patient and limits gait/full return to PLOF. Patient has full PROM extension indicating available joint mobility, but soft tissue limitation. Other assessment as follows:  Pain at best 1/10, at worst 8/10at night   Subjective % improvement 80%  Objective:    AROM 2- 112 pre manual , 0-119 post manual    Knee strength flexion 4/5, extension 4+/5   Hip strength flexion 4+, ABD 4+, Extension 4+,   Edema at mid patella R 34, L 32; joint line R 30, L 29   Gait improved - decreased heel strike sec to lack of TKE   Pain: lateral knee   Improvements: amb 5 miles without AD, no locking with sit to stand transfers, stamina and spirit improved, stair negotiation : step over step without AD, flexion ROM for sitting, driving  Deficits pain at night, am stiffness, bicycling, elliptical, LE wt lifting, running for safety         Goals for this period include:  1. Pt will improve FOTO score to >/= 66 to demo a significant improvement in functional activity tolerance  Will reassess at 30 days after 6/15/18 reassessment (18)   2.   Patient will demo full knee extension to 0 deg/ B symmetrical for improved gait quality goal met at B symmetrical in supine after MT (18) 3.  Patient will demo 5/5 knee flexion strength for progression to prior level of function  No lag with SLR, quad 4+/5, HS 4/5 (7/2/18)   4. Patient will demo AROM knee flexion to 120 for improved transfers   Goal progressing, AROM 112, PROM 120 (7/2/18)       New Goals to be achieved in __8-12__  treatments:  1. Pt will improve FOTO score to >/= 66 to demo a significant improvement in functional activity tolerance    2. Patient will demo full knee extension to 0 deg/ B symmetrical (without MT) for improved gait quality    3. Patient will demo 5/5 knee flexion and hip ext strength for progression to prior level of function    4. Patient will demo AROM knee flexion to 120 for improved transfers      G-Codes: NA  RECOMMENDATIONS  Patient would benefit from continued skilled PT to address above deficits in ROM, strength and function. Cont 2-3x for 8-12 sessions or as insurance allows. If you have any questions/comments please contact us directly at [phone]   Thank you for allowing us to assist in the care of your patient. Therapist Signature: Eldon Starks PT Date: 7/2/2018     Time: 1135am    NOTE TO PHYSICIAN:  PLEASE COMPLETE THE ORDERS BELOW AND FAX TO   [facility]: [phone]. If you are unable to process this request in 24 hours please contact our office: [phone].  ___ I have read the above report and request that my patient continue as recommended.   ___ I have read the above report and request that my patient continue therapy with the following changes/special instructions:_________________________________________________________   ___ I have read the above report and request that my patient be discharged from therapy.      Physician Signature:        Date:       Time:

## 2018-07-02 NOTE — PROGRESS NOTES
PT DAILY TREATMENT NOTE     Patient Name: Brandin Blackwell  Date:2018  : 1960  [x]  Patient  Verified  Payor:  / Plan: [facility] / Product Type:  /    In time:11:04  Out time:12:00  Total Treatment Time (min): 56  Total Timed Codes (min): 46  1:1 Treatment Time (min): 46   Visit #: 3 of     Treatment Area: Acute postoperative pain of right knee [G89.18, M25.561]    SUBJECTIVE  Pain Level (0-10 scale): 2  Any medication changes, allergies to medications, adverse drug reactions, diagnosis change, or new procedure performed?: [x] No    [] Yes (see summary sheet for update)  Subjective functional status/changes:   [] No changes reported  Some localied pain around the outside of the R knee. DId lots of work this weekend and stretching too. See lots of night pain.      OBJECTIVE  Modality rationale: decrease edema, decrease inflammation, decrease pain and increase tissue extensibility to improve the patients ability to improve gait, mobility, stairs   Min Type Additional Details    [] Estim: []Att   []Unatt        []TENS instruct                  []IFC  []Premod   []NMES                     []Other:  []w/US   []w/ice   []w/heat  Position:  Location:    []  Traction: [] Cervical       []Lumbar                       [] Prone          []Supine                       []Intermittent   []Continuous Lbs:  [] before manual  [] after manual    []  Ultrasound: []Continuous   [] Pulsed                           []1MHz   []3MHz Location:  W/cm2:    []  Iontophoresis with dexamethasone         Location: [] Take home patch   [] In clinic   10 [x]  Ice   Anterior   [x]  Heat posterior   []  Ice massage Position: reclined  Location: R knee    []  Vasopneumatic Device Pressure:       [] lo [] med [] hi   Temperature: [] lo [] med [] hi   [x] Skin assessment post-treatment:  [x]intact []redness- no adverse reaction       []redness - adverse reaction:       20 min Therapeutic Exercise: [x] See flow sheet : review and update HEP   Rationale: increase ROM, increase strength, improve coordination, improve balance and increase proprioception to improve the patients ability to improve gait, stairs, mobility     26 min Manual:  R anterior innom with (-) shotgun and MET for correction; Extension OP,flexion with posterior tib mobs to increase R knee flexion; prone knee flexion/ quad stretch   , gastroc stretch   Reassessment for MD note; review and update HEP   Rationale: decrease pain, increase ROM, increase tissue extensibility and decrease trigger points to improve gait, mobility           x min Patient Education: [x] Review HEP    [x] Progressed/Changed HEP based on:   [] positioning   [] body mechanics   [] transfers   [] heat/ice application        Other Objective/Functional Measures:   AROM R knee 2 to 112; PROM 0 to 119  MMT R hip flexion 5/5, abduction (TFL) 4+/5, Glute med 4+/5, glute max 4/5, HS 4/5; quad 4+/5  Edema: midpatella increased 2cm, joint line 1cm     Pain Level (0-10 scale) post treatment: 1    ASSESSMENT/Changes in Function: see PN    Patient will continue to benefit from skilled PT services to modify and progress therapeutic interventions, address functional mobility deficits, address ROM deficits, address strength deficits, analyze and address soft tissue restrictions, analyze and cue movement patterns, analyze and modify body mechanics/ergonomics, assess and modify postural abnormalities and instruct in home and community integration to attain remaining goals. []  See Plan of Care  []  See progress note/recertification  []  See Discharge Summary         Progress towards goals / Updated goals:  1. Pt will improve FOTO score to >/= 66 to demo a significant improvement in functional activity tolerance    2. Patient will demo full knee extension to 0 deg/ B symmetrical for improved gait quality goal met at B symmetrical in supine after MT (7/2/18)   3.   Patient will demo 5/5 knee flexion strength for progression to prior level of function  No lag with SLR, quad 4+/5, HS 4/5 (7/2/18)   4.   Patient will demo AROM knee flexion to 120 for improved transfers   Goal progressing; AROM 112, PROM 120 (7/2/18)         PLAN  [x]  Upgrade activities as tolerated     []  Continue plan of care  []  Update interventions per flow sheet       []  Discharge due to:_  [x]  Other:_  Check for innom correction needed; assess response to MD visit tomorrow     Sarath Malone, PT 7/2/2018  7:51 AM

## 2018-07-05 ENCOUNTER — HOSPITAL ENCOUNTER (OUTPATIENT)
Dept: PHYSICAL THERAPY | Age: 58
Discharge: HOME | End: 2018-07-05
Payer: OTHER GOVERNMENT

## 2018-07-05 PROCEDURE — 97110 THERAPEUTIC EXERCISES: CPT

## 2018-07-05 PROCEDURE — 97140 MANUAL THERAPY 1/> REGIONS: CPT

## 2018-07-05 NOTE — PROGRESS NOTES
PT DAILY TREATMENT NOTE     Patient Name: Daryl Baez  Date:2018  : 1960  [x]  Patient  Verified  Payor:  / Plan: Pravin Swain 74 / Product Type:  /    In time:5:04  Out time:6:08  Total Treatment Time (min): 65  Total Timed Codes (min): 55  1:1 Treatment Time (min): 55   Visit #: 1 of     Treatment Area: Acute postoperative pain of right knee [G89.18, M25.561]    SUBJECTIVE  Pain Level (0-10 scale): 1  Any medication changes, allergies to medications, adverse drug reactions, diagnosis change, or new procedure performed?: [x] No    [] Yes (see summary sheet for update)  Subjective functional status/changes:   [] No changes reported  Saw MD on Tuesday. MD notes: good strength. Addressed night pain, \"probably from the PT.      Next appt in 1 year   MD notes that spot     OBJECTIVE  Modality rationale: decrease edema, decrease inflammation, decrease pain and increase tissue extensibility to improve the patients ability to improve gait, mobility    Min Type Additional Details    [] Estim: []Att   []Unatt        []TENS instruct                  []IFC  []Premod   []NMES                     []Other:  []w/US   []w/ice   []w/heat  Position:  Location:    []  Traction: [] Cervical       []Lumbar                       [] Prone          []Supine                       []Intermittent   []Continuous Lbs:  [] before manual  [] after manual    []  Ultrasound: []Continuous   [] Pulsed                           []1MHz   []3MHz Location:  W/cm2:    []  Iontophoresis with dexamethasone         Location: [] Take home patch   [] In clinic   10 [x]  Ice  anterior     [x]  Heat posterior   []  Ice massage Position: reclined  Location: R knee    []  Vasopneumatic Device Pressure:       [] lo [] med [] hi   Temperature: [] lo [] med [] hi   [x] Skin assessment post-treatment:  [x]intact []redness- no adverse reaction       []redness - adverse reaction:       40 min Therapeutic Exercise:  [x] See flow sheet : added RDL's, mtn climbers at parallel bars, lunges    Rationale: increase ROM, increase strength, improve coordination and improve balance to improve the patients ability to improve full return to gait, work     15 min Manual Therapy:  SIJ equal today. IASTM to R ITB, quad, Ext PROM and gastroc stretch. PROM flexion with posterior TFJ mobs to increase flexion    Rationale: decrease pain, increase ROM, increase tissue extensibility and decrease trigger points to improve mobility            x min Patient Education: [x] Review HEP    [] Progressed/Changed HEP based on:   [] positioning   [] body mechanics   [] transfers   [] heat/ice application        Other Objective/Functional Measures:   Pt attempted bosu mountain cimbers (slow) but came up into a deep lunge with her R foot and notes R lateral knee pain. Walked around the clinic for a min with decrease. Able to do mountain climbers at the parallel bars without c/o pain     Pain Level (0-10 scale) post treatment: 1    ASSESSMENT/Changes in Function: Progressing well with strength, gait and balance. Able to do partial lunges with good form and no c/o pain. Min end range firm end feel with extension. Improved tolerance to PROM and active knee flexion     Patient will continue to benefit from skilled PT services to modify and progress therapeutic interventions, address functional mobility deficits, address ROM deficits, address strength deficits, analyze and address soft tissue restrictions, analyze and cue movement patterns, analyze and modify body mechanics/ergonomics, assess and modify postural abnormalities, address imbalance/dizziness and instruct in home and community integration to attain remaining goals. []  See Plan of Care  []  See progress note/recertification  []  See Discharge Summary         Progress towards goals / Updated goals:  1.   Pt will improve FOTO score to >/= 66 to demo a significant improvement in functional activity tolerance    2. Patient will demo full knee extension to 0 deg/ B symmetrical (without MT) for improved gait quality  Lacking 2 PROM (7/5/18)   3. Patient will demo 5/5 knee flexion and hip ext strength for progression to prior level of function  Goal progressing with increased TE and good response (7/5/18)   4.   Patient will demo AROM knee flexion to 120 for improved transfers          PLAN  [x]  Upgrade activities as tolerated     []  Continue plan of care  []  Update interventions per flow sheet       []  Discharge due to:_  [x]  Other:_ add kickers NV (SLS with quick repeated 3 way hip movements of the swing leg)     Meghna Ortiz, PT 7/5/2018  8:32 AM

## 2018-07-06 ENCOUNTER — APPOINTMENT (OUTPATIENT)
Dept: PHYSICAL THERAPY | Age: 58
End: 2018-07-06
Payer: OTHER GOVERNMENT

## 2018-07-10 ENCOUNTER — HOSPITAL ENCOUNTER (OUTPATIENT)
Dept: PHYSICAL THERAPY | Age: 58
Discharge: HOME | End: 2018-07-10
Payer: OTHER GOVERNMENT

## 2018-07-10 PROCEDURE — 97140 MANUAL THERAPY 1/> REGIONS: CPT

## 2018-07-10 PROCEDURE — 97110 THERAPEUTIC EXERCISES: CPT

## 2018-07-10 NOTE — PROGRESS NOTES
PT DAILY TREATMENT NOTE     Patient Name: Mike Anderson  Date:7/10/2018  : 1960  [x]  Patient  Verified  Payor:  / Plan: Pravin Swain 74 / Product Type:  /    In time:100  Out time:202  Total Treatment Time (min): 62  Visit #: 2 of     Treatment Area: Acute postoperative pain of right knee [G89.18, M25.561]    SUBJECTIVE  Pain Level (0-10 scale): 1  Any medication changes, allergies to medications, adverse drug reactions, diagnosis change, or new procedure performed?: [x] No    [] Yes (see summary sheet for update)  Subjective functional status/changes:   [] No changes reported  \"My night pain has gotten much better. \"    OBJECTIVE  Modality rationale: decrease edema, decrease inflammation, decrease pain and increase tissue extensibility to improve the patients ability to improve gait, mobility    Min Type Additional Details    [] Estim: []Att   []Unatt        []TENS instruct                  []IFC  []Premod   []NMES                     []Other:  []w/US   []w/ice   []w/heat  Position:  Location:    []  Traction: [] Cervical       []Lumbar                       [] Prone          []Supine                       []Intermittent   []Continuous Lbs:  [] before manual  [] after manual    []  Ultrasound: []Continuous   [] Pulsed                           []1MHz   []3MHz Location:  W/cm2:    []  Iontophoresis with dexamethasone         Location: [] Take home patch   [] In clinic   10 [x]  Ice  anterior     [x]  Heat posterior   []  Ice massage Position: reclined  Location: R knee    []  Vasopneumatic Device Pressure:       [] lo [] med [] hi   Temperature: [] lo [] med [] hi   [x] Skin assessment post-treatment:  [x]intact []redness- no adverse reaction       []redness - adverse reaction:       40 min Therapeutic Exercise:  [x] See flow sheet    Rationale: increase ROM, increase strength, improve coordination and improve balance to improve the patients ability to improve full return to gait, work     12 min Manual Therapy:  SIJ equal today. Passive extension OP, grade 2-3 joint mobs for flexion and extension, manual HS stretch    Rationale: decrease pain, increase ROM, increase tissue extensibility and decrease trigger points to improve mobility            x min Patient Education: [x] Review HEP      - added steamboats to HEP      Other Objective/Functional Measures: Added steamboat kickers for balance and stability    Improvement: pm pain    AROM pre stretch 3 deg extension ( L 2 deg extension)    Pain Level (0-10 scale) post treatment: 1    ASSESSMENT/Changes in Function: Patient reports no adverse reaction with progression last session despite having sharp pain. Patient feels overall improved function. Tolerated treatment progression well with noted hip fatigue. Patient continues to be challenged by modified mtn climbers because of knee flexion movement, no pain in stance phase. Patient will continue to benefit from skilled PT services to modify and progress therapeutic interventions, address functional mobility deficits, address ROM deficits, address strength deficits, analyze and address soft tissue restrictions, analyze and cue movement patterns, analyze and modify body mechanics/ergonomics, assess and modify postural abnormalities, address imbalance/dizziness and instruct in home and community integration to attain remaining goals. []  See Plan of Care  [x]  See progress note/recertification  []  See Discharge Summary         Progress towards goals / Updated goals: All goals reviewed and progressing appropriately as of 7/10/2018  1. Pt will improve FOTO score to >/= 66 to demo a significant improvement in functional activity tolerance    2. Patient will demo full knee extension to 0 deg/ B symmetrical (without MT) for improved gait quality  Lacking 2 PROM (7/5/18)   3.   Patient will demo 5/5 knee flexion and hip ext strength for progression to prior level of function  Goal progressing with increased TE and good response (7/5/18)   4.   Patient will demo AROM knee flexion to 120 for improved transfers        PLAN  [x]  Upgrade activities as tolerated     []  Continue plan of care  []  Update interventions per flow sheet       []  Discharge due to:_  [x]  Other:_cont to challenge strength and core    Dione Duke, PT 7/10/2018

## 2018-07-12 ENCOUNTER — APPOINTMENT (OUTPATIENT)
Dept: PHYSICAL THERAPY | Age: 58
End: 2018-07-12
Payer: OTHER GOVERNMENT

## 2018-07-17 ENCOUNTER — HOSPITAL ENCOUNTER (OUTPATIENT)
Dept: PHYSICAL THERAPY | Age: 58
Discharge: HOME | End: 2018-07-17
Payer: OTHER GOVERNMENT

## 2018-07-17 PROCEDURE — 97140 MANUAL THERAPY 1/> REGIONS: CPT

## 2018-07-17 PROCEDURE — 97110 THERAPEUTIC EXERCISES: CPT

## 2018-07-17 NOTE — PROGRESS NOTES
PT DAILY TREATMENT NOTE     Patient Name: Joan Rebolledo  Date:2018  : 1960  [x]  Patient  Verified  Payor: SAI / Plan: Pravin Swain 74 / Product Type: [employer] /    In time:1105  Out time: 1214  Total Treatment Time (min): 71  Visit #: 3 of     Treatment Area: Acute postoperative pain of right knee [G89.18, M25.561]    SUBJECTIVE  Pain Level (0-10 scale): 1  Any medication changes, allergies to medications, adverse drug reactions, diagnosis change, or new procedure performed?: [x] No    [] Yes (see summary sheet for update)  Subjective functional status/changes:   [] No changes reported  \"My night pain is pretty much resolved and I think the therapy 1xwk is helping me progress. It still feels tight, but Im able to walk much longer. \"    OBJECTIVE  Modality rationale: decrease edema, decrease inflammation, decrease pain and increase tissue extensibility to improve the patients ability to improve gait, mobility    Min Type Additional Details    [] Estim: []Att   []Unatt        []TENS instruct                  []IFC  []Premod   []NMES                     []Other:  []w/US   []w/ice   []w/heat  Position:  Location:    []  Traction: [] Cervical       []Lumbar                       [] Prone          []Supine                       []Intermittent   []Continuous Lbs:  [] before manual  [] after manual    []  Ultrasound: []Continuous   [] Pulsed                           []1MHz   []3MHz Location:  W/cm2:    []  Iontophoresis with dexamethasone         Location: [] Take home patch   [] In clinic   10 [x]  Ice  anterior     [x]  Heat posterior   []  Ice massage Position: reclined  Location: R knee    []  Vasopneumatic Device Pressure:       [] lo [] med [] hi   Temperature: [] lo [] med [] hi   [x] Skin assessment post-treatment:  [x]intact []redness- no adverse reaction       []redness - adverse reaction:       51 min Therapeutic Exercise:  [x] See flow sheet    Rationale: increase ROM, increase strength, improve coordination and improve balance to improve the patients ability to improve full return to gait, work     8 min Manual Therapy:  SI check-Level; right tibiofemoral distraction and glides into extension (grade III to tolerance)   Rationale: decrease pain, increase ROM, increase tissue extensibility and decrease trigger points to improve mobility            x min Patient Education: [x] Review HEP      - added steamboats to HEP      Other Objective/Functional Measures: Added: split squats, plank on elbow donkey kicks and grasshoppers, see-saw RLE only  -2 degrees RLE TKE at start of session which was reduced to -1 after manual  Pt lacking 1 degree of LLE TKE    Pain Level (0-10 scale) post treatment: 0    ASSESSMENT/Changes in Function:   Manual cueing at right rectus femoris throughout new therex with focus on CKC eccentric strengthening portion. A mirror was utilized for visual feedback to prevent right knee valgus collapse. Pt with noticeable quadricep fatigue with eccentric holds. Patient will continue to benefit from skilled PT services to modify and progress therapeutic interventions, address functional mobility deficits, address ROM deficits, address strength deficits, analyze and address soft tissue restrictions, analyze and cue movement patterns, analyze and modify body mechanics/ergonomics, assess and modify postural abnormalities, address imbalance/dizziness and instruct in home and community integration to attain remaining goals. []  See Plan of Care  []  See progress note/recertification  []  See Discharge Summary         Progress towards goals / Updated goals: All goals reviewed and progressing appropriately as of 7/17/2018  1. Pt will improve FOTO score to >/= 66 to demo a significant improvement in functional activity tolerance    2.   Patient will demo full knee extension to 0 deg/ B symmetrical (without MT) for improved gait quality  Lacking 2 PROM at start of session and 1 degrees at the end (7/17/18)   3. Patient will demo 5/5 knee flexion and hip ext strength for progression to prior level of function  Goal progressing-challenged with eccentric CKC therex (7/17/18)   4.   Patient will demo AROM knee flexion to 120 for improved transfers        PLAN  [x]  Upgrade activities as tolerated     []  Continue plan of care  []  Update interventions per flow sheet       []  Discharge due to:_  [x]  Other: continue to work on adding therex weekly to establish a comprehensive [address], PT 7/17/2018

## 2018-07-20 ENCOUNTER — APPOINTMENT (OUTPATIENT)
Dept: PHYSICAL THERAPY | Age: 58
End: 2018-07-20
Payer: OTHER GOVERNMENT

## 2018-07-24 ENCOUNTER — HOSPITAL ENCOUNTER (OUTPATIENT)
Dept: PHYSICAL THERAPY | Age: 58
Discharge: HOME | End: 2018-07-24
Payer: OTHER GOVERNMENT

## 2018-07-24 PROCEDURE — 97110 THERAPEUTIC EXERCISES: CPT | Performed by: PHYSICAL THERAPIST

## 2018-07-24 PROCEDURE — 97140 MANUAL THERAPY 1/> REGIONS: CPT | Performed by: PHYSICAL THERAPIST

## 2018-07-24 NOTE — PROGRESS NOTES
PT DAILY TREATMENT NOTE     Patient Name: Lennox Nancy  Date:2018  : 1960  [x]  Patient  Verified  Payor: SAI / Plan: Pravin Swain 74 / Product Type:  /    In time: 1107  Out time: 2897  Total Treatment Time (min): 70  Visit #: 4 of     Treatment Area: Acute postoperative pain of right knee [G89.18, M25.561]    SUBJECTIVE  Pain Level (0-10 scale): 1  Any medication changes, allergies to medications, adverse drug reactions, diagnosis change, or new procedure performed?: [x] No    [] Yes (see summary sheet for update)  Subjective functional status/changes:   [] No changes reported  \"I am sore on the outside of my knee today\"    OBJECTIVE  Modality rationale: decrease edema, decrease inflammation, decrease pain and increase tissue extensibility to improve the patients ability to improve gait, mobility    Min Type Additional Details    [] Estim: []Att   []Unatt        []TENS instruct                  []IFC  []Premod   []NMES                     []Other:  []w/US   []w/ice   []w/heat  Position:  Location:    []  Traction: [] Cervical       []Lumbar                       [] Prone          []Supine                       []Intermittent   []Continuous Lbs:  [] before manual  [] after manual    []  Ultrasound: []Continuous   [] Pulsed                           []1MHz   []3MHz Location:  W/cm2:    []  Iontophoresis with dexamethasone         Location: [] Take home patch   [] In clinic   10 [x]  Ice  anterior     [x]  Heat posterior   []  Ice massage Position: reclined  Location: R knee    []  Vasopneumatic Device Pressure:       [] lo [] med [] hi   Temperature: [] lo [] med [] hi   [x] Skin assessment post-treatment:  [x]intact []redness- no adverse reaction       []redness - adverse reaction:       50/45 min Therapeutic Exercise:  [x] See flow sheet    Rationale: increase ROM, increase strength, improve coordination and improve balance to improve the patients ability to improve full return to gait, work     10 min Manual Therapy: EOB distraction /flexion mobes, SL rolling to R ITB   Rationale: decrease pain, increase ROM, increase tissue extensibility and decrease trigger points to improve mobility            x min Patient Education: [x] Review HEP      - added steamboats to HEP      Other Objective/Functional Measures:     TKE L/R : -2 deg B   Flexion: 107 deg prior to MT   Progressed RDLs to 2#   Pain Level (0-10 scale) post treatment: 0    ASSESSMENT/Changes in Function:     Patient has decreased knee flexion noted today, discussed importance of prolonged stretching to keep available ROM. No progression of therex today as program progressed last visit and pt reports significant soreness in lateral knee/ITB insertion. Noted taught ness and TTP in ITB today. Patient will continue to benefit from skilled PT services to modify and progress therapeutic interventions, address functional mobility deficits, address ROM deficits, address strength deficits, analyze and address soft tissue restrictions, analyze and cue movement patterns, analyze and modify body mechanics/ergonomics, assess and modify postural abnormalities, address imbalance/dizziness and instruct in home and community integration to attain remaining goals. []  See Plan of Care  []  See progress note/recertification  []  See Discharge Summary         Progress towards goals / Updated goals: All goals reviewed and progressing appropriately as of 7/24/2018  1. Pt will improve FOTO score to >/= 66 to demo a significant improvement in functional activity tolerance    2. Patient will demo full knee extension to 0 deg/ B symmetrical (without MT) for improved gait quality   Lacking 2deg  AROM B at start of session (7/24/18)   3. Patient will demo 5/5 knee flexion and hip ext strength for progression to prior level of function  Goal progressing-challenged with eccentric CKC therex (7/17/18)   4.   Patient will demo AROM knee flexion to 120 for improved transfers        PLAN  [x]  Upgrade activities as tolerated     []  Continue plan of care  []  Update interventions per flow sheet       []  Discharge due to:_  [x]  Other: continue to reinforce flexion stretching to maintain ROM    Natalie Nelson, PT 7/24/2018

## 2018-07-31 ENCOUNTER — HOSPITAL ENCOUNTER (OUTPATIENT)
Dept: PHYSICAL THERAPY | Age: 58
Discharge: HOME | End: 2018-07-31
Payer: OTHER GOVERNMENT

## 2018-07-31 PROCEDURE — 97140 MANUAL THERAPY 1/> REGIONS: CPT | Performed by: PHYSICAL THERAPIST

## 2018-07-31 PROCEDURE — 97110 THERAPEUTIC EXERCISES: CPT | Performed by: PHYSICAL THERAPIST

## 2018-07-31 NOTE — PROGRESS NOTES
PT DAILY TREATMENT NOTE     Patient Name: Joe Short  Date:2018  : 1960  [x]  Patient  Verified  Payor: SAI / Plan: Pravin Swain 74 / Product Type:  /    In time: 8665  Out time: 6940  Total Treatment Time (min): 59  Visit #: 5 of     Treatment Area: Acute postoperative pain of right knee [G89.18, M25.561]    SUBJECTIVE  Pain Level (0-10 scale): 1  Any medication changes, allergies to medications, adverse drug reactions, diagnosis change, or new procedure performed?: [x] No    [] Yes (see summary sheet for update)  Subjective functional status/changes:   [] No changes reported  \"My  got me a rope to do my stretching with. \"    OBJECTIVE  Modality rationale: decrease edema, decrease inflammation, decrease pain and increase tissue extensibility to improve the patients ability to improve gait, mobility    Min Type Additional Details    [] Estim: []Att   []Unatt        []TENS instruct                  []IFC  []Premod   []NMES                     []Other:  []w/US   []w/ice   []w/heat  Position:  Location:    []  Traction: [] Cervical       []Lumbar                       [] Prone          []Supine                       []Intermittent   []Continuous Lbs:  [] before manual  [] after manual    []  Ultrasound: []Continuous   [] Pulsed                           []1MHz   []3MHz Location:  W/cm2:    []  Iontophoresis with dexamethasone         Location: [] Take home patch   [] In clinic   10 [x]  Ice  anterior     [x]  Heat posterior   []  Ice massage Position: reclined  Location: R knee    []  Vasopneumatic Device Pressure:       [] lo [] med [] hi   Temperature: [] lo [] med [] hi   [x] Skin assessment post-treatment:  [x]intact []redness- no adverse reaction       []redness - adverse reaction:       44/39 min Therapeutic Exercise:  [x] See flow sheet    Rationale: increase ROM, increase strength, improve coordination and improve balance to improve the patients ability to improve full return to gait, work     10 min Manual Therapy: EOB distraction /flexion mobes, SL rolling to R ITB   Rationale: decrease pain, increase ROM, increase tissue extensibility and decrease trigger points to improve mobility            x min Patient Education: [x] Review HEP      - added steamboats to HEP      Other Objective/Functional Measures:     Flexion: 115 deg after to MT   Added slow march on BOSU to increase stability  PN needed NV    Pain Level (0-10 scale) post treatment: 1    ASSESSMENT/Changes in Function:   Increased challenge with SLS activities at a slower speed ([employer], and [employer]). requiring increased stabilization. Improved Knee flexion today from 107 to 115 deg. Knee extension = Bilaterally at -2deg. .       Patient will continue to benefit from skilled PT services to modify and progress therapeutic interventions, address functional mobility deficits, address ROM deficits, address strength deficits, analyze and address soft tissue restrictions, analyze and cue movement patterns, analyze and modify body mechanics/ergonomics, assess and modify postural abnormalities, address imbalance/dizziness and instruct in home and community integration to attain remaining goals. []  See Plan of Care  []  See progress note/recertification  []  See Discharge Summary         Progress towards goals / Updated goals: All goals reviewed and progressing appropriately as of 7/31/2018  1. Pt will improve FOTO score to >/= 66 to demo a significant improvement in functional activity tolerance    2. Patient will demo full knee extension to 0 deg/ B symmetrical (without MT) for improved gait quality   Lacking 2deg  AROM B at start of session (7/24/18)   3. Patient will demo 5/5 knee flexion and hip ext strength for progression to prior level of function  Goal progressing-challenged with eccentric CKC therex (7/17/18)   4.   Patient will demo AROM knee flexion to 120 for improved transfers 7/31/18    115 deg.       PLAN  [x]  Upgrade activities as tolerated     []  Continue plan of care  []  Update interventions per flow sheet       []  Discharge due to:_  [x]  Other: continue to reinforce flexion stretching to maintain ROM    Anahy Alcaraz, PT 7/31/2018

## 2018-08-07 ENCOUNTER — APPOINTMENT (OUTPATIENT)
Dept: PHYSICAL THERAPY | Age: 58
End: 2018-08-07
Payer: OTHER GOVERNMENT

## 2018-08-09 ENCOUNTER — HOSPITAL ENCOUNTER (OUTPATIENT)
Dept: PHYSICAL THERAPY | Age: 58
Discharge: HOME | End: 2018-08-09
Payer: OTHER GOVERNMENT

## 2018-08-09 PROCEDURE — 97140 MANUAL THERAPY 1/> REGIONS: CPT

## 2018-08-09 PROCEDURE — 97110 THERAPEUTIC EXERCISES: CPT

## 2018-08-09 NOTE — PROGRESS NOTES
[address] [facility] AT [address] [place]  Phone: [phone] Fax: [phone]  PROGRESS NOTE  Patient Name: Molly Mac : 1960   Treatment/Medical Diagnosis: Acute postoperative pain of right knee [G89.18, M25.561]   Referral Source: Adrain Burkitt, MD     Date of Initial Visit: 18 Attended Visits: 20 Missed Visits: 0     SUMMARY OF TREATMENT  Pt is a 62y.o. year old female with subjective complaints of R knee pain s/p TKA 3/26/2018. Treatment has included progressive therex, aggressive manual to patient's tolerance for joint mobility and ice. CURRENT STATUS  Patient progressing fairly well overall, however continues to have AROM extension and flexion deficits that prevents return to function. Patient has full PROM extension indicating available joint mobility, but soft tissue limitation. Other assessment as follows:  Pain at best 1/10, at worst 9/10 with quick movements into flexion    Subjective % improvement 85%  Improvements: amb 5 miles; \"all the things\", no locking with sit to stand transfers, normal stairs, driving;   Deficits: quick flexion movements, normalized gait, soreness in full body this week , morning stiffness    Pain ranges 0 - 9 (with quick flexion movements)  85% improvement  AROM 2- 115 pre manual , 0-120 post manual                            Knee strength flexion 4/5 (L 4+/5), extension 4+/5                           Hip strength flexion 5, ABD 5, Extension 5, R IR: 3+/5 (L 4+/5),                            Edema at mid patella R 34, L 32; joint line R 31, L 30                           Gait improved - almost normalized gait; min lacking TKE/heel strike                            Pain: lateral knee , ITB insertion    Goals for this period include:  1. Pt will improve FOTO score to >/= 66 to demo a significant improvement in functional activity tolerance Goal met at 66/100 (18)   2. Patient will demo full knee extension to 0 deg/ B symmetrical (without MT) for improved gait quality   Lacking 2deg  AROM B at start of session (8/9/18)   3. Patient will demo 5/5 knee flexion and hip ext strength for progression to prior level of function  Goal progressing-challenged with eccentric CKC therex (8/9/18)   4. Patient will demo AROM knee flexion to 120 for improved transfers goal progressing at 120 AAROM after MT (8/9/18)     New Goals to be achieved in __4__  treatments:  1. Patient will demo full knee extension to 0 deg/ B symmetrical (without MT) for improved gait quality  2. Patient will demo 5/5 knee flexion and hip ext strength for progression to prior level of function  3. Patient will demo AROM knee flexion to 120 for improved transfers    G-Codes: NA  RECOMMENDATIONS  Patient would benefit from continued skilled PT to address above deficits in ROM, strength and function. Due to full body fatigue and soreness, advised that she rest from heavy strength training and activity for 1-2 weeks before her next PT f/u on 8/21/18. If you have any questions/comments please contact us directly at [phone]   Thank you for allowing us to assist in the care of your patient. Therapist Signature: Niki Tierney, PT Date: 8/9/2018     Time: 1135am    NOTE TO PHYSICIAN:  PLEASE COMPLETE THE ORDERS BELOW AND FAX TO   [facility] at [address] [place] [address]: [phone]. If you are unable to process this request in 24 hours please contact our office: [phone].  ___ I have read the above report and request that my patient continue as recommended.   ___ I have read the above report and request that my patient continue therapy with the following changes/special instructions:_________________________________________________________   ___ I have read the above report and request that my patient be discharged from therapy.      Physician Signature:        Date:       Time:

## 2018-08-09 NOTE — PROGRESS NOTES
PT DAILY TREATMENT NOTE     Patient Name: Aura Son  Date:2018  : 1960  [x]  Patient  Verified  Payor:  / Plan: Pravin Swain 74 / Product Type:  /    In time:2:32  Out time:3:36  Total Treatment Time (min): 64  Total Timed Codes (min): 54  1:1 Treatment Time (min): 44  Visit #: 6 of     Treatment Area: Acute postoperative pain of right knee [G89.18, M25.561]    SUBJECTIVE  Pain Level (0-10 scale): 2  Any medication changes, allergies to medications, adverse drug reactions, diagnosis change, or new procedure performed?: [x] No    [] Yes (see summary sheet for update)  Subjective functional status/changes:   [] No changes reported  I'm super sore recently, this week in the last 6 weeks is very store. My routine has not change a ton more or less.     See surgeon again in 2019  Pending foot surgery on the R side; need a letter of release (from PT or MD?)    OBJECTIVE  Modality rationale: decrease edema, decrease inflammation and decrease pain to improve the patients ability to improve gait, mobility    Min Type Additional Details    [] Estim: []Att   []Unatt        []TENS instruct                  []IFC  []Premod   []NMES                     []Other:  []w/US   []w/ice   []w/heat  Position:  Location:    []  Traction: [] Cervical       []Lumbar                       [] Prone          []Supine                       []Intermittent   []Continuous Lbs:  [] before manual  [] after manual    []  Ultrasound: []Continuous   [] Pulsed                           []1MHz   []3MHz Location:  W/cm2:    []  Iontophoresis with dexamethasone         Location: [] Take home patch   [] In clinic   10 [x]  Ice anterior    [x]  Heat posterior   []  Ice massage Position: reclined  Location: R knee    []  Vasopneumatic Device Pressure:       [] lo [] med [] hi   Temperature: [] lo [] med [] hi   [x] Skin assessment post-treatment:  [x]intact []redness- no adverse reaction []redness - adverse reaction:       40 (29) min Therapeutic Exercise:  [x] See flow sheet : reassessment    Rationale: increase ROM, increase strength, improve coordination, improve balance and increase proprioception to improve the patients ability to improve gait, sports, stairs     15 min Manual Therapy:  Posterior tFJ mobs with knee flexion; EOB distraction /flexion mobes    Instruction and demo on PT tech of how to perform mobs at home with her daughter    Rationale: decrease pain, increase ROM and increase tissue extensibility to improve gait, sport, ADs.           x min Patient Education: [x] Review HEP    [x] Progressed/Changed HEP based on: mobilizations by her daugher at home to increase knee flexion for squatting, stairs;     Advised pt to take a rest break from heavy strength training for the next 12 days before session; add easy aquatic training with walking and light TE, ice    [] positioning   [] body mechanics   [] transfers   [] heat/ice application        Other Objective/Functional Measures:     Pain ranges 0 - 9 (with quick flexion movements)  85% improvement  AROM 2- 115 pre manual , 0-120 post manual    Knee strength flexion 4/5 (L 4+/5), extension 4+/5   Hip strength flexion 5, ABD 5, Extension 5, R IR: 3+/5 (L 4+/5),    Edema at mid patella R 34, L 32; joint line R 31, L 30   Gait improved - almost normalized gait; min lacking TKE/heel strike    Pain: lateral knee , ITB insertion  Improvements: all things; walking 3-5 miles; stairs up and down, walking, driving, overall much better  Deficits: stiffness harshil into flexion; quick movements with flexion, sitting on the floor; squatting to sit     Pain Level (0-10 scale) post treatment: 1    ASSESSMENT/Changes in Function: see PN    Patient will continue to benefit from skilled PT services to modify and progress therapeutic interventions, address functional mobility deficits, address ROM deficits, address strength deficits, analyze and address soft tissue restrictions, analyze and cue movement patterns, analyze and modify body mechanics/ergonomics, assess and modify postural abnormalities, address imbalance/dizziness and instruct in home and community integration to attain remaining goals. []  See Plan of Care  [x]  See progress note/recertification  []  See Discharge Summary         Progress towards goals / Updated goals:  1. Pt will improve FOTO score to >/= 66 to demo a significant improvement in functional activity tolerance Goal met at 66/100 (8/9/18)   2. Patient will demo full knee extension to 0 deg/ B symmetrical (without MT) for improved gait quality   Lacking 2deg  AROM B at start of session (8/9/18)   3. Patient will demo 5/5 knee flexion and hip ext strength for progression to prior level of function  Goal progressing-challenged with eccentric CKC therex (8/9/18)   4. Patient will demo AROM knee flexion to 120 for improved transfers goal progressing at 120 AAROM after MT (8/9/18)         PLAN  [x]  Upgrade activities as tolerated     []  Continue plan of care  []  Update interventions per flow sheet       []  Discharge due to:_  [x]  Other:_  Next appt on 8/21/18. Assess response to rest/recovery in next 12 days.       Tu Briseno, PT 8/9/2018  2:00 PM

## 2018-08-14 ENCOUNTER — APPOINTMENT (OUTPATIENT)
Dept: PHYSICAL THERAPY | Age: 58
End: 2018-08-14
Payer: OTHER GOVERNMENT

## 2018-08-21 ENCOUNTER — HOSPITAL ENCOUNTER (OUTPATIENT)
Dept: PHYSICAL THERAPY | Age: 58
End: 2018-08-21
Payer: OTHER GOVERNMENT

## 2018-08-23 ENCOUNTER — HOSPITAL ENCOUNTER (OUTPATIENT)
Dept: PHYSICAL THERAPY | Age: 58
Discharge: HOME | End: 2018-08-23
Payer: OTHER GOVERNMENT

## 2018-08-23 PROCEDURE — 97140 MANUAL THERAPY 1/> REGIONS: CPT

## 2018-08-23 PROCEDURE — 97035 APP MDLTY 1+ULTRASOUND EA 15: CPT

## 2018-08-23 PROCEDURE — 97110 THERAPEUTIC EXERCISES: CPT

## 2018-08-23 NOTE — PROGRESS NOTES
PT DAILY TREATMENT NOTE     Patient Name: Mike Anderson  Date:2018  : 1960  [x]  Patient  Verified  Payor:  / Plan: Riccardo Krill / Product Type:  /    In time:300  Out time:411  Total Treatment Time (min): 71  Visit #: 1 of 4    Treatment Area: Acute postoperative pain of right knee [G89.18, M25.561]    SUBJECTIVE  Pain Level (0-10 scale): 1  Any medication changes, allergies to medications, adverse drug reactions, diagnosis change, or new procedure performed?: [x] No    [] Yes (see summary sheet for update)  Subjective functional status/changes:   [] No changes reported  \"I didn't get in the pool. I kept walking and doing my stairs and stretches. My dgt did the mobilizations for me. \"    OBJECTIVE  Modality rationale: decrease edema, decrease inflammation and decrease pain to improve the patients ability to improve gait, mobility    Min Type Additional Details    [] Estim: []Att   []Unatt        []TENS instruct                  []IFC  []Premod   []NMES                     []Other:  []w/US   []w/ice   []w/heat  Position:  Location:    []  Traction: [] Cervical       []Lumbar                       [] Prone          []Supine                       []Intermittent   []Continuous Lbs:  [] before manual  [] after manual   10 [x]  Ultrasound: [x]Continuous   [] Pulsed                           [x]1MHz   []3MHz Location: ant knee - in EOB knee flexion to open joint space   W/cm2: 1.5    []  Iontophoresis with dexamethasone         Location: [] Take home patch   [] In clinic   10 [x]  Ice anterior    [x]  Heat posterior   []  Ice massage Position: reclined  Location: R knee    []  Vasopneumatic Device Pressure:       [] lo [] med [] hi   Temperature: [] lo [] med [] hi   [x] Skin assessment post-treatment:  [x]intact []redness- no adverse reaction       []redness - adverse reaction:       31 min Therapeutic Exercise:  [x] See flow sheet :    Rationale: increase ROM, increase strength, improve coordination, improve balance and increase proprioception to improve the patients ability to improve gait, sports, stairs     20 min Manual Therapy:  EOB, grade 3-4 joint mobs for flexion, DTM to lateral joint line , supine grade 3-4 joint mobs for flexion    Rationale: decrease pain, increase ROM and increase tissue extensibility to improve gait, sport, ADs.           x min Patient Education: [x] Review HEP        Other Objective/Functional Measures:    Chief complaint: stiffness/ pain   Hard end feel with flexion      Pain Level (0-10 scale) post treatment: 0-1/ 10 stiffness     ASSESSMENT/Changes in Function: Patient encouraged to rest and recovery - patient reports she was unable to get into the pool, but does feel like she took time to rest from strengthening exercise. Patient cont to persist with hard end feel at end range of available flexion. Factors to consider include limited ROM prior to knee surgery and significant sof t tissue shortening. Attempted hi intensity deep US to warm and elongate tissue prior to manual intervention. Patient having foot surgery - seeing ortho on Sept 5. Patient will continue to benefit from skilled PT services to modify and progress therapeutic interventions, address functional mobility deficits, address ROM deficits, address strength deficits, analyze and address soft tissue restrictions, analyze and cue movement patterns, analyze and modify body mechanics/ergonomics, assess and modify postural abnormalities, address imbalance/dizziness and instruct in home and community integration to attain remaining goals. []  See Plan of Care  [x]  See progress note/recertification  []  See Discharge Summary         New Goals to be achieved in __4__  treatments:   1. Patient will demo full knee extension to 0 deg/ B symmetrical (without MT) for improved gait quality  2.  Patient will demo 5/5 knee flexion and hip ext strength for progression to prior level of function  3. Patient will demo AROM knee flexion to 120 for improved transfers    PLAN  [x]  Upgrade activities as tolerated     []  Continue plan of care  []  Update interventions per flow sheet       []  Discharge due to:_  [x]  Other:_  Cont US for 3-4 sessions to assess outcome .       Betsy Mitchell, PT 8/23/2018

## 2018-08-28 ENCOUNTER — APPOINTMENT (OUTPATIENT)
Dept: PHYSICAL THERAPY | Age: 58
End: 2018-08-28
Payer: OTHER GOVERNMENT

## 2018-08-30 ENCOUNTER — HOSPITAL ENCOUNTER (OUTPATIENT)
Dept: PHYSICAL THERAPY | Age: 58
Discharge: HOME | End: 2018-08-30
Payer: OTHER GOVERNMENT

## 2018-08-30 PROCEDURE — 97035 APP MDLTY 1+ULTRASOUND EA 15: CPT

## 2018-08-30 PROCEDURE — 97140 MANUAL THERAPY 1/> REGIONS: CPT

## 2018-08-30 PROCEDURE — 97110 THERAPEUTIC EXERCISES: CPT

## 2018-08-30 NOTE — PROGRESS NOTES
PT DAILY TREATMENT NOTE     Patient Name: Salvatore Guzman  Date:2018  : 1960  [x]  Patient  Verified  Payor:  / Plan: Pravin Swain 74 / Product Type:  /    In time:12:04  Out time:1:08  Total Treatment Time (min): 64  Total Timed Codes (min): 54  1:1 Treatment Time (min): 54   Visit #: 2 of 4    Treatment Area: Acute postoperative pain of right knee [G89.18, M25.561]    SUBJECTIVE  Pain Level (0-10 scale): 0 \"stiff\"  Any medication changes, allergies to medications, adverse drug reactions, diagnosis change, or new procedure performed?: [x] No    [] Yes (see summary sheet for update)  Subjective functional status/changes:   [] No changes reported  Let's keep doing the US. Just feeling stiff   The numbness and tingling on the outside of my knee is getting better.    Just trouble going down stairs really       OBJECTIVE  Modality rationale: decrease edema, decrease inflammation, decrease pain and increase tissue extensibility to improve the patients ability to improve gait, knee fleixon for stairs    Min Type Additional Details    [] Estim: []Att   []Unatt        []TENS instruct                  []IFC  []Premod   []NMES                     []Other:  []w/US   []w/ice   []w/heat  Position:  Location:    []  Traction: [] Cervical       []Lumbar                       [] Prone          []Supine                       []Intermittent   []Continuous Lbs:  [] before manual  [] after manual   10 [x]  Ultrasound: [x]Continuous   [] Pulsed                           [x]1MHz   []3MHz Location: in EOB knee flexion to open joint space  W/cm2: 1.5    []  Iontophoresis with dexamethasone         Location: [] Take home patch   [] In clinic   10 [x]  Ice     [x]  heat  []  Ice massage Position:  Location:    []  Vasopneumatic Device Pressure:       [] lo [] med [] hi   Temperature: [] lo [] med [] hi   [x] Skin assessment post-treatment:  [x]intact []redness- no adverse reaction       []redness - adverse reaction:       24 min Therapeutic Exercise:  [x] See flow sheet :   Rationale: increase ROM, increase strength, improve coordination, improve balance and increase proprioception to improve the patients ability to improve gait, sport, stairs, running     20 min Manual Therapy:  EOB, grade 3-4 joint mobs for flexion, DTM to lateral joint line , supine grade 3-4 joint mobs for flexion    Rationale: decrease pain, increase ROM, increase tissue extensibility and decrease edema  to improve gait, running, stairs           x min Patient Education: [x] Review HEP    [] Progressed/Changed HEP based on:   [] positioning   [] body mechanics   [] transfers   [] heat/ice application        Other Objective/Functional Measures:       AROM R knee:  2 to 117     Pain Level (0-10 scale) post treatment: 0    ASSESSMENT/Changes in Function: firm end feel continues. Responding well to current addition of US and will con't to assess for changes in ROM. Presents with less overall soreness from DOMS    Patient will continue to benefit from skilled PT services to modify and progress therapeutic interventions, address functional mobility deficits, address ROM deficits, address strength deficits, analyze and address soft tissue restrictions, analyze and cue movement patterns, analyze and modify body mechanics/ergonomics, assess and modify postural abnormalities, address imbalance/dizziness and instruct in home and community integration to attain remaining goals. []  See Plan of Care  []  See progress note/recertification  []  See Discharge Summary         Progress towards goals / Updated goals:  1. Patient will demo full knee extension to 0 deg/ B symmetrical (without MT) for improved gait quality AROM 2 degrees (8/30/18)  2. Patient will demo 5/5 knee flexion and hip ext strength for progression to prior level of function Progressing well with TE (8/30/18)  3.  Patient will demo AROM knee flexion to 120 for improved transfers Steady progress, but maintaining approx 117 to 120 (8/30/18)    PLAN  [x]  Upgrade activities as tolerated     []  Continue plan of care  []  Update interventions per flow sheet       []  Discharge due to:_  [x]  Other:_ Patient having foot surgery - seeing ortho on Sept 5.       Pauline Patrick, PT 8/30/2018  9:19 AM

## 2018-09-06 ENCOUNTER — HOSPITAL ENCOUNTER (OUTPATIENT)
Dept: PHYSICAL THERAPY | Age: 58
Discharge: HOME | End: 2018-09-06
Payer: OTHER GOVERNMENT

## 2018-09-06 PROCEDURE — 97140 MANUAL THERAPY 1/> REGIONS: CPT

## 2018-09-06 PROCEDURE — 97110 THERAPEUTIC EXERCISES: CPT

## 2018-09-06 PROCEDURE — 97035 APP MDLTY 1+ULTRASOUND EA 15: CPT

## 2018-09-06 NOTE — PROGRESS NOTES
PT DAILY TREATMENT NOTE     Patient Name: Francisco Guerrero  Date:2018  : 1960  [x]  Patient  Verified  Payor:  / Plan: Seth Rahman / Product Type:  /    In time:4:05  Out time:5:14  Total Treatment Time (min): 69  Total Timed Codes (min): 59  1:1 Treatment Time (min): 61   Visit #: 3 of 4    Treatment Area: Acute postoperative pain of right knee [G89.18, M25.561]    SUBJECTIVE  Pain Level (0-10 scale): 1  Any medication changes, allergies to medications, adverse drug reactions, diagnosis change, or new procedure performed?: [x] No    [] Yes (see summary sheet for update)  Subjective functional status/changes:   [] No changes reported  Better at going down stairs now.    Results of appt with ortho for foot pathology: Surgery on       OBJECTIVE  Modality rationale: decrease pain and increase tissue extensibility to improve the patients ability to improve knee ROM for stairs, sport    Min Type Additional Details    [] Estim: []Att   []Unatt        []TENS instruct                  []IFC  []Premod   []NMES                     []Other:  []w/US   []w/ice   []w/heat  Position:  Location:    []  Traction: [] Cervical       []Lumbar                       [] Prone          []Supine                       []Intermittent   []Continuous Lbs:  [] before manual  [] after manual   10 [x]  Ultrasound: [x]Continuous   [] Pulsed                           [x]1MHz   []3MHz Location: EOB knee flexion to open joint space; to media and lateral joint lines  W/cm2: 1.5    []  Iontophoresis with dexamethasone         Location: [] Take home patch   [] In clinic   10 [x]  Ice  Anterior    [x]  Heat- posterior   []  Ice massage Position: long sitting  Location: R knee    []  Vasopneumatic Device Pressure:       [] lo [] med [] hi   Temperature: [] lo [] med [] hi   [x] Skin assessment post-treatment:  [x]intact []redness- no adverse reaction       []redness - adverse reaction:       34 min Therapeutic Exercise:  [x] See flow sheet : added 1/2 kneeling for functional core strength but pain with WB'ing on the knee     Rationale: increase ROM, increase strength, improve coordination, improve balance and increase proprioception to improve the patients ability to improve gait, stairs, sport    15 min Manual Therapy:  EOB, grade 3-4 joint mobs for flexion, DTM to lateral joint line , supine grade 3-4 joint mobs for flexion and extension     Rationale: decrease pain, increase ROM and increase tissue extensibility to improve gait, stairs             x min Patient Education: [x] Review HEP   Discussed use of elliptical at home for ROM, strength, CV fitness        Other Objective/Functional Measures:   AROM R knee: 0 to 117   Pt notes difficulty with lunging with R LE behind. Initiated 1/2 kneeling on airex to challenge hip stability but pt notes discomfort in WB\"ing on the R knee    Pain Level (0-10 scale) post treatment: 0 \"but feel it\"    ASSESSMENT/Changes in Function: increased knee extension ROM. Firm end feel with flexion continues but less discomfort noted . Challenged by exercises requiring R hip stability in staggered stance. Progressing well towards goals and appropriate for D/C NV    Patient will continue to benefit from skilled PT services to modify and progress therapeutic interventions, address functional mobility deficits, address ROM deficits, address strength deficits, analyze and address soft tissue restrictions, analyze and cue movement patterns, analyze and modify body mechanics/ergonomics, assess and modify postural abnormalities, address imbalance/dizziness and instruct in home and community integration to attain remaining goals. []  See Plan of Care  []  See progress note/recertification  []  See Discharge Summary         Progress towards goals / Updated goals:  1.  Patient will demo full knee extension to 0 deg/ B symmetrical (without MT) for improved gait quality Goal met; AROM 0 degrees (9/6/18)  2. Patient will demo 5/5 knee flexion and hip ext strength for progression to prior level of function Progressing well with TE  (9/6/18)  3. Patient will demo AROM knee flexion to 120 for improved transfers Steady progress, but maintaining approx 117 to 120 (9/6/18)    PLAN  [x]  Upgrade activities as tolerated     []  Continue plan of care  []  Update interventions per flow sheet       []  Discharge due to:_  []  Other:_  Assess for D/C NV.  Pt pending foot surgery 10/23/18     Graeme Mondragon PT 9/6/2018  1:35 PM

## 2018-09-13 ENCOUNTER — APPOINTMENT (OUTPATIENT)
Dept: PHYSICAL THERAPY | Age: 58
End: 2018-09-13
Payer: OTHER GOVERNMENT

## 2018-09-20 ENCOUNTER — HOSPITAL ENCOUNTER (OUTPATIENT)
Dept: PHYSICAL THERAPY | Age: 58
Discharge: HOME | End: 2018-09-20
Payer: OTHER GOVERNMENT

## 2018-09-20 PROCEDURE — 97140 MANUAL THERAPY 1/> REGIONS: CPT | Performed by: PHYSICAL THERAPIST

## 2018-09-20 PROCEDURE — 97110 THERAPEUTIC EXERCISES: CPT | Performed by: PHYSICAL THERAPIST

## 2018-09-20 PROCEDURE — 97035 APP MDLTY 1+ULTRASOUND EA 15: CPT | Performed by: PHYSICAL THERAPIST

## 2018-09-20 NOTE — PROGRESS NOTES
[address] [facility] [address] [facility] AT [address], [place], [place] [address]  Phone: [phone] Fax [phone] SUMMARY  Patient Name: Bruno Laurent : 1960   Treatment/Medical Diagnosis: Acute postoperative pain of right knee [G89.18, M25.561]   Referral Source: Argenis Navarrete MD     Date of Initial Visit: 18 Attended Visits: 24 Missed Visits: 2     SUMMARY OF TREATMENT  Treatment has consisted of therapeutic exercises, manual therapy, ultrasound, and moist heat to address strength and ROM deficits. CURRENT STATUS  Patient has made good progress in therapy. Strength has improved however ROM has reached a plateau. She has met all goals except for ROM however reports no functional loss with ADLs. She was educated today to continue performing ROM and stretching exercises to continue working on her ROM after discharge. Pain at best: 0/10, pain at worse 1-2/10   Remaining deficits: stiffness. AROM: 0-115 degrees  Knee strength 5/5 ext/flex  Hip strength 5/5 in all planes    Progress towards goals :  1. Patient will demo full knee extension to 0 deg/ B symmetrical (without MT) for improved gait quality Goal met; AROM 0 degrees (18)  2. Patient will demo 5/5 knee flexion and hip ext strength for progression to prior level of function Met,  (18)  3. Patient will demo AROM knee flexion to 120 for improved transfers Steady progress, but maintaining approx 115  (18)     RECOMMENDATIONS  Discontinue therapy. Progressing towards or have reached established goals. Gcode: NA  If you have any questions/comments please contact us directly at [phone]. Thank you for allowing us to assist in the care of your patient.   Therapist Signature: Jonna Lopez DPT Date: 18   Reporting Period: NA Time: 7:06 PM

## 2018-09-20 NOTE — PROGRESS NOTES
PT DAILY TREATMENT NOTE     Patient Name: Bruno Laurent  Date:2018  : 1960  [x]  Patient  Verified  Payor:  / Plan: Pravin Swain 74 / Product Type:  /    In time:3:08  Out time:4:18  Total Treatment Time (min): 70  Total Timed Codes (min): 45  1:1 Treatment Time (min): 45   Visit #: 4 of 4    Treatment Area: Acute postoperative pain of right knee [G89.18, M25.561]    SUBJECTIVE  Pain Level (0-10 scale): 1/10  Any medication changes, allergies to medications, adverse drug reactions, diagnosis change, or new procedure performed?: [x] No    [] Yes (see summary sheet for update)  Subjective functional status/changes:   [] No changes reported  \"Just stiff. \"    OBJECTIVE  Modality rationale: increase tissue extensibility to improve the patients ability to negotiate steps and ambulate.    Min Type Additional Details    [] Estim: []Att   []Unatt        []TENS instruct                  []IFC  []Premod   []NMES                     []Other:  []w/US   []w/ice   []w/heat  Position:  Location:    []  Traction: [] Cervical       []Lumbar                       [] Prone          []Supine                       []Intermittent   []Continuous Lbs:  [] before manual  [] after manual   8 [x]  Ultrasound: [x]Continuous   [] Pulsed                           [x]1MHz   []3MHz Location: lateral and medial knee  W/cm2: 1.5    []  Iontophoresis with dexamethasone         Location: [] Take home patch   [] In clinic   10 []  Ice     [x]  heat  []  Ice massage Position: sitting  Location: right knee    []  Vasopneumatic Device Pressure:       [] lo [] med [] hi   Temperature: [] lo [] med [] hi   [x] Skin assessment post-treatment:  [x]intact [x]redness- no adverse reaction       []redness - adverse reaction:       37 min Therapeutic Exercise:  [x] See flow sheet :   Rationale: increase ROM, increase strength, improve coordination and increase proprioception to improve the patients ability to ambulate and negotiate stairs. 15 min Manual Therapy:  STM to lateral retinaculum, distal quad, extension and flexion mobilization   Rationale: increase ROM and increase tissue extensibility to to improve gait. with TE min Patient Education: [x] Review HEP    [] Progressed/Changed HEP based on:   [x] positioning   [] body mechanics   [] transfers   [] heat/ice application        Other Objective/Functional Measures:   Knee AROM 0-115 degrees. Gross LE strength 5/5    Pain Level (0-10 scale) post treatment: 0/10    ASSESSMENT/Changes in Function:    []  See Plan of Care  []  See progress note/recertification  [x]  See Discharge Summary         Progress towards goals / Updated goals:  1. Patient will demo full knee extension to 0 deg/ B symmetrical (without MT) for improved gait quality Goal met; AROM 0 degrees (9/6/18)  2. Patient will demo 5/5 knee flexion and hip ext strength for progression to prior level of function Met,  (9/20/18)  3.  Patient will demo AROM knee flexion to 120 for improved transfers Steady progress, but maintaining approx 115  (9/20/18)       PLAN  []  Upgrade activities as tolerated     []  Continue plan of care  []  Update interventions per flow sheet       [x]  Discharge due to:_  []  Other:_      Milka Delvalle DPT 9/20/2018  3:13 PM

## 2023-08-02 ENCOUNTER — ANESTHESIA EVENT (OUTPATIENT)
Facility: HOSPITAL | Age: 63
End: 2023-08-02
Payer: OTHER GOVERNMENT

## 2023-08-03 ENCOUNTER — ANESTHESIA (OUTPATIENT)
Facility: HOSPITAL | Age: 63
End: 2023-08-03
Payer: OTHER GOVERNMENT

## 2023-08-03 ENCOUNTER — HOSPITAL ENCOUNTER (OUTPATIENT)
Facility: HOSPITAL | Age: 63
Setting detail: OUTPATIENT SURGERY
Discharge: HOME | End: 2023-08-03
Attending: INTERNAL MEDICINE | Admitting: INTERNAL MEDICINE
Payer: OTHER GOVERNMENT

## 2023-08-03 VITALS
HEART RATE: 83 BPM | TEMPERATURE: 97.8 F | WEIGHT: 133.8 LBS | HEIGHT: 63 IN | RESPIRATION RATE: 18 BRPM | BODY MASS INDEX: 23.71 KG/M2 | DIASTOLIC BLOOD PRESSURE: 77 MMHG | SYSTOLIC BLOOD PRESSURE: 126 MMHG | OXYGEN SATURATION: 100 %

## 2023-08-03 PROCEDURE — 7100000010 HC PHASE II RECOVERY - FIRST 15 MIN: Performed by: INTERNAL MEDICINE

## 2023-08-03 PROCEDURE — 3700000000 HC ANESTHESIA ATTENDED CARE: Performed by: INTERNAL MEDICINE

## 2023-08-03 PROCEDURE — 2580000003 HC RX 258: Performed by: INTERNAL MEDICINE

## 2023-08-03 PROCEDURE — 7100000011 HC PHASE II RECOVERY - ADDTL 15 MIN: Performed by: INTERNAL MEDICINE

## 2023-08-03 PROCEDURE — 88305 TISSUE EXAM BY PATHOLOGIST: CPT

## 2023-08-03 PROCEDURE — 3600007502: Performed by: INTERNAL MEDICINE

## 2023-08-03 PROCEDURE — 3700000001 HC ADD 15 MINUTES (ANESTHESIA): Performed by: INTERNAL MEDICINE

## 2023-08-03 PROCEDURE — 2709999900 HC NON-CHARGEABLE SUPPLY: Performed by: INTERNAL MEDICINE

## 2023-08-03 PROCEDURE — 2500000003 HC RX 250 WO HCPCS

## 2023-08-03 PROCEDURE — 6360000002 HC RX W HCPCS

## 2023-08-03 PROCEDURE — 3600007512: Performed by: INTERNAL MEDICINE

## 2023-08-03 RX ORDER — PROPOFOL 10 MG/ML
INJECTION, EMULSION INTRAVENOUS PRN
Status: DISCONTINUED | OUTPATIENT
Start: 2023-08-03 | End: 2023-08-03 | Stop reason: SDUPTHER

## 2023-08-03 RX ORDER — NALOXONE HYDROCHLORIDE 0.4 MG/ML
0.4 INJECTION, SOLUTION INTRAMUSCULAR; INTRAVENOUS; SUBCUTANEOUS PRN
Status: DISCONTINUED | OUTPATIENT
Start: 2023-08-03 | End: 2023-08-03 | Stop reason: HOSPADM

## 2023-08-03 RX ORDER — FENTANYL CITRATE 50 UG/ML
100 INJECTION, SOLUTION INTRAMUSCULAR; INTRAVENOUS
Status: DISCONTINUED | OUTPATIENT
Start: 2023-08-03 | End: 2023-08-03 | Stop reason: HOSPADM

## 2023-08-03 RX ORDER — ATROPINE SULFATE 0.4 MG/ML
0.4 INJECTION, SOLUTION INTRAVENOUS ONCE
Status: DISCONTINUED | OUTPATIENT
Start: 2023-08-03 | End: 2023-08-03 | Stop reason: HOSPADM

## 2023-08-03 RX ORDER — ALBUTEROL SULFATE 90 UG/1
AEROSOL, METERED RESPIRATORY (INHALATION)
COMMUNITY
Start: 2023-08-01

## 2023-08-03 RX ORDER — SIMETHICONE 20 MG/.3ML
40 EMULSION ORAL EVERY 6 HOURS PRN
Status: DISCONTINUED | OUTPATIENT
Start: 2023-08-03 | End: 2023-08-03 | Stop reason: HOSPADM

## 2023-08-03 RX ORDER — EPINEPHRINE IN SOD CHLOR,ISO 1 MG/10 ML
1 SYRINGE (ML) INTRAVENOUS ONCE
Status: DISCONTINUED | OUTPATIENT
Start: 2023-08-03 | End: 2023-08-03 | Stop reason: HOSPADM

## 2023-08-03 RX ORDER — FLUMAZENIL 0.1 MG/ML
0.2 INJECTION INTRAVENOUS ONCE
Status: DISCONTINUED | OUTPATIENT
Start: 2023-08-03 | End: 2023-08-03 | Stop reason: HOSPADM

## 2023-08-03 RX ORDER — SODIUM CHLORIDE 9 MG/ML
INJECTION, SOLUTION INTRAVENOUS CONTINUOUS
Status: CANCELLED | OUTPATIENT
Start: 2023-08-03

## 2023-08-03 RX ORDER — MIDAZOLAM HYDROCHLORIDE 1 MG/ML
5 INJECTION, SOLUTION INTRAMUSCULAR; INTRAVENOUS
Status: DISCONTINUED | OUTPATIENT
Start: 2023-08-03 | End: 2023-08-03 | Stop reason: HOSPADM

## 2023-08-03 RX ORDER — SODIUM CHLORIDE 9 MG/ML
INJECTION, SOLUTION INTRAVENOUS CONTINUOUS
Status: DISCONTINUED | OUTPATIENT
Start: 2023-08-03 | End: 2023-08-03 | Stop reason: HOSPADM

## 2023-08-03 RX ORDER — DEXAMETHASONE 4 MG/1
TABLET ORAL
COMMUNITY
Start: 2023-05-30

## 2023-08-03 RX ORDER — LORATADINE 10 MG/1
TABLET ORAL
COMMUNITY
Start: 2023-08-01

## 2023-08-03 RX ORDER — GLYCOPYRROLATE 0.2 MG/ML
0.1 INJECTION INTRAMUSCULAR; INTRAVENOUS ONCE
Status: DISCONTINUED | OUTPATIENT
Start: 2023-08-03 | End: 2023-08-03 | Stop reason: HOSPADM

## 2023-08-03 RX ORDER — LIDOCAINE HYDROCHLORIDE 20 MG/ML
INJECTION, SOLUTION EPIDURAL; INFILTRATION; INTRACAUDAL; PERINEURAL PRN
Status: DISCONTINUED | OUTPATIENT
Start: 2023-08-03 | End: 2023-08-03 | Stop reason: SDUPTHER

## 2023-08-03 RX ORDER — FLUTICASONE PROPIONATE 50 MCG
SPRAY, SUSPENSION (ML) NASAL
COMMUNITY
Start: 2023-08-01

## 2023-08-03 RX ORDER — DIPHENHYDRAMINE HYDROCHLORIDE 50 MG/ML
25 INJECTION INTRAMUSCULAR; INTRAVENOUS EVERY 6 HOURS PRN
Status: DISCONTINUED | OUTPATIENT
Start: 2023-08-03 | End: 2023-08-03 | Stop reason: HOSPADM

## 2023-08-03 RX ADMIN — PROPOFOL 40 MG: 10 INJECTION, EMULSION INTRAVENOUS at 11:49

## 2023-08-03 RX ADMIN — PROPOFOL 40 MG: 10 INJECTION, EMULSION INTRAVENOUS at 11:45

## 2023-08-03 RX ADMIN — PROPOFOL 40 MG: 10 INJECTION, EMULSION INTRAVENOUS at 11:55

## 2023-08-03 RX ADMIN — PROPOFOL 40 MG: 10 INJECTION, EMULSION INTRAVENOUS at 11:47

## 2023-08-03 RX ADMIN — PROPOFOL 30 MG: 10 INJECTION, EMULSION INTRAVENOUS at 11:44

## 2023-08-03 RX ADMIN — PROPOFOL 30 MG: 10 INJECTION, EMULSION INTRAVENOUS at 11:54

## 2023-08-03 RX ADMIN — PROPOFOL 50 MG: 10 INJECTION, EMULSION INTRAVENOUS at 11:43

## 2023-08-03 RX ADMIN — PROPOFOL 50 MG: 10 INJECTION, EMULSION INTRAVENOUS at 11:57

## 2023-08-03 RX ADMIN — SODIUM CHLORIDE: 9 INJECTION, SOLUTION INTRAVENOUS at 11:24

## 2023-08-03 RX ADMIN — PROPOFOL 40 MG: 10 INJECTION, EMULSION INTRAVENOUS at 11:51

## 2023-08-03 RX ADMIN — PROPOFOL 30 MG: 10 INJECTION, EMULSION INTRAVENOUS at 11:48

## 2023-08-03 RX ADMIN — PROPOFOL 30 MG: 10 INJECTION, EMULSION INTRAVENOUS at 11:46

## 2023-08-03 RX ADMIN — PROPOFOL 40 MG: 10 INJECTION, EMULSION INTRAVENOUS at 11:53

## 2023-08-03 RX ADMIN — LIDOCAINE HYDROCHLORIDE 60 MG: 20 INJECTION, SOLUTION EPIDURAL; INFILTRATION; INTRACAUDAL; PERINEURAL at 11:43

## 2023-08-03 ASSESSMENT — PAIN - FUNCTIONAL ASSESSMENT: PAIN_FUNCTIONAL_ASSESSMENT: 0-10

## 2023-08-03 ASSESSMENT — PAIN SCALES - GENERAL: PAINLEVEL_OUTOF10: 0

## 2023-08-03 ASSESSMENT — LIFESTYLE VARIABLES: SMOKING_STATUS: 0

## 2023-08-03 NOTE — ANESTHESIA POSTPROCEDURE EVALUATION
Department of Anesthesiology  Postprocedure Note    Patient: Karissa Williamson  MRN: [id number]  YOB: 1960  Date of evaluation: 8/3/2023      Procedure Summary     Date: 08/03/23 Room / Location: [facility] ENDO 02 / [facility] ENDOSCOPY    Anesthesia Start: 1138 Anesthesia Stop: 1205    Procedure: COLONOSCOPY DIAGNOSTIC; POLYPECTOMY (Lower GI Region) Diagnosis:       Encounter for screening colonoscopy      (Encounter for screening colonoscopy [Z12.11])    Surgeons: Lea Ojeda MD Responsible Provider: Rosalva Tan DO    Anesthesia Type: MAC ASA Status: 2          Anesthesia Type: MAC    Marquis Phase I: Marquis Score: 9    Marquis Phase II:        Anesthesia Post Evaluation    Level of consciousness: awake and alert  Pain score: 0  Airway patency: patent  Nausea & Vomiting: no nausea and no vomiting  Cardiovascular status: blood pressure returned to baseline  Respiratory status: acceptable and room air  Hydration status: euvolemic  Pain management: adequate

## 2023-08-03 NOTE — DISCHARGE INSTRUCTIONS
Diana Bonilla  [id number]  1960    COLON DISCHARGE INSTRUCTIONS    Discomfort:  Redness at IV site- apply warm compress to area; if redness or soreness persist- contact your physician  There may be a slight amount of blood passed from the rectum  Gaseous discomfort- walking, belching will help relieve any discomfort  You should not operate a vehicle for 12 hours  You should not engage in an occupation involving machinery or appliances for rest of today  You may not drink alcoholic beverages for at least 12 hours  Avoid making any critical decisions for at least 24 hour  DIET:   Regular Diet   - however -  remember your colon is empty and a heavy meal will produce gas. Avoid these foods:  vegetables, fried / greasy foods, carbonated drinks for today     ACTIVITY:  You may resume your normal daily activities it is recommended that you spend the remainder of the day resting -  avoid any strenuous activity. CALL M.D. ANY SIGN OF:   Increasing pain, nausea, vomiting  Abdominal distension (swelling)  New increased bleeding (oral or rectal)  Fever (chills)  Pain in chest area  Bloody discharge from nose or mouth  Shortness of breath      Follow-up Instructions: Will send letter with pathology results and when next colonoscopy due. Diana Bonilla  [id number]  1960        DISCHARGE SUMMARY from Nurse    PATIENT INSTRUCTIONS:    After general anesthesia or intravenous sedation, for 24 hours or while taking prescription Narcotics:  Limit your activities  Do not drive and operate hazardous machinery  Do not make important personal or business decisions  Do  not drink alcoholic beverages  If you have not urinated within 8 hours after discharge, please contact your surgeon on call.     Report the following to your surgeon:  Excessive pain, swelling, redness or odor of or around the surgical area  Temperature over 100.5  Nausea and vomiting lasting longer than 4 hours or if unable to

## 2023-08-03 NOTE — H&P
Assessment/Plan  # Detail Type Description    1. Assessment Personal history of colonic polyps (Z86.010). Patient Plan This 57 y/o lady was referred for consult for screening colo. Her last colo was 5 years ago with finding of benign polyps and recommendation to repeat in 5 years. Pertinent negatives include family history of CRC, personal history of cancer, changes in bowel habits,  hematochezia, abdominal pain, n&v and unintentional weight loss. Schedule for COLO with MAC, miralax prep. The risks, benefits, adverse reactions were discussed in detail today with the patient. This includes, but is not limited to, the risk of bleeding, infections, perforation, death, missed lesions, reactions to anesthesia/sedation, other. Patient understands and agrees to undergo the procedure. 2. Assessment Body mass index (BMI) 24.0-24.9, adult (C28.12). This 58year old  patient was referred by Cherise Elder. This 58year old female presents for Colon Cancer Screening. History of Present Illness  1. Colon Cancer Screening     Problem List  Problem List reviewed. Past Medical/Surgical History   (Detailed)  Patient reported no relevant past medical/surgical history. Family History   (Detailed)    Patient reports there is no relevant family history. Social History  (Detailed)  Tobacco use reviewed. Tobacco use status: Current non-smoker. Smoking status: Never smoker. Tobacco Screening  Patient has never used tobacco. Patient has not used tobacco in the last 30 days. Patient has not used smokeless tobacco in the last 30 days. Smoking Status  Type Smoking Status Usage Per Day Years Used Pack Years Total Pack Years    Never smoker         Alcohol  There is a history of alcohol use. consumed socially. Caffeine  The patient uses caffeine.       Medications (active prior to today)  Medication Instructions Start Date Stop Date Refilled Elsewhere   DHEA Progesterone capsule 125mg N       multivitamin tablet  N       NP Thyroid  N       PROAIR RESPICLICK inhale 2 puff by inhalation route  every 4 - 6 hours as needed N       quercetin 500 mg capsule  N       testosterone Verabase cream 50mg/ml N       Vitamin C  N       Vitamin D3 125 mcg (5,000 unit) tablet  N        Allergies  Ingredient Reaction (Severity) Medication Name Comment   NO KNOWN ALLERGIES        Reviewed, no changes. Review of Systems  System Neg/Pos Details   Constitutional Negative Chills, Fever and Weight loss. ENMT Negative Ear infections and Nasal congestion. Respiratory Negative Chronic cough, Dyspnea and Wheezing. Cardio Negative Chest pain. GI Negative Abdominal mass, Abdominal pain, Bloating, Blood in stool, Change in appetite, Change in bowel habits, Constipation, Decreased appetite, Diarrhea, Dysphagia, Fecal incontinence, Flatulence, Heartburn, Hematemesis, Hemorrhoids, Increased appetite, Jaundice, Melena, Nausea, Odynophagia, Rectal bleeding, Reflux and Vomiting.  Negative Dysuria. Endocrine Negative Cold intolerance and Heat intolerance. Neuro Negative Dizziness and Headache. Psych Negative Anxiety and Depression. MS Negative Back pain and Joint pain. Hema/Lymph Negative Easy bleeding and Easy bruising.        Vital Signs   Height  Time ft in cm Last Measured Height Position   5:14 PM 5.0 3.00 160.02 10/31/2022      Weight/BSA/BMI  Time lb oz kg Context BMI kg/m2 BSA m2   5:14 .00  62.142 dressed with shoes 24.27      Blood Pressure  Time BP mm/Hg Position Side Site Method Cuff Size   5:14 /90 sitting right arm manual      Temperature/Pulse/Respiration  Time Temp F Temp C Temp Site Pulse/min Pattern Resp/ min   5:14 PM    72       Pulse Oximetry/FIO2  Time Pulse Ox (Rest %) Pulse Ox (Amb %) O2 Sat O2 L/Min Timing FiO2 % L/min Delivery Method Finger Probe   5:14 PM 98  RA           Measured by  Time Measured by   5:14 PM Jaylene Burkitt

## 2023-08-03 NOTE — ANESTHESIA PRE PROCEDURE
CV ROS  Exercise tolerance: good (>4 METS),           Rhythm: regular  Rate: normal           Beta Blocker:  Not on Beta Blocker         Neuro/Psych:   Negative Neuro/Psych ROS              GI/Hepatic/Renal: Neg GI/Hepatic/Renal ROS            Endo/Other: Negative Endo/Other ROS                    Abdominal:             Vascular: negative vascular ROS. Other Findings:           Anesthesia Plan      MAC     ASA 2       Induction: intravenous. Anesthetic plan and risks discussed with patient. Plan discussed with CRNA.                     Elfego Lopes MD   8/3/2023

## 2023-08-03 NOTE — PROCEDURES
Colonoscopy Procedure Note    Indications: Previous adenomatous polyp    Anesthesia/Sedation: MAC anesthesia Propofol    Pre-Procedure Exam:  Airway: clear   Heart: normal S1and S2    Lungs: clear bilateral  Abdomen: soft, nontender, bowel sounds present and normal in all quadrants   Mental Status: awake, alert, and oriented to person, place, and time      Procedure in Detail:  Informed consent was obtained for the procedure, including sedation. Risks of perforation, hemorrhage, adverse drug reaction, and aspiration were discussed. The patient was placed in the left lateral decubitus position. Based on the pre-procedure assessment, including review of the patient's medical history, medications, allergies, and review of systems, she had been deemed to be an appropriate candidate for moderate sedation; she was therefore sedated with the medications listed above. The patient was monitored continuously with ECG tracing, pulse oximetry, blood pressure monitoring, and direct observations. A rectal examination was performed. The [id number] was inserted into the rectum and advanced under direct vision to the cecum, which was identified by the ileocecal valve and appendiceal orifice. The quality of the colonic preparation was good. A careful inspection was made as the colonoscope was withdrawn, including a retroflexed view of the rectum; findings and interventions are described below. Appropriate photodocumentation was obtained. ANUS: Anal exam reveals no masses or hemorrhoids, sphincter tone is normal.   RECTUM: Rectal exam reveals no masses or hemorrhoids.    SIGMOID COLON: The mucosa is normal with good vascular pattern and without ulcers, diverticula, and 6mm polyp removed cold snare  DESCENDING COLON: The mucosa is normal with good vascular pattern and without ulcers, diverticula, and polyps  SPLENIC FLEXURE: The splenic flexure is normal.   TRANSVERSE COLON: The mucosa is normal with good vascular pattern

## 2023-08-03 NOTE — PROCEDURES
Colonoscopy Procedure Note    Indications: Previous adenomatous polyp    Anesthesia/Sedation: MAC anesthesia Propofol    Pre-Procedure Exam:  Airway: clear   Heart: normal S1and S2    Lungs: clear bilateral  Abdomen: soft, nontender, bowel sounds present and normal in all quadrants   Mental Status: awake, alert, and oriented to person, place, and time      Procedure in Detail:  Informed consent was obtained for the procedure, including sedation. Risks of perforation, hemorrhage, adverse drug reaction, and aspiration were discussed. The patient was placed in the left lateral decubitus position. Based on the pre-procedure assessment, including review of the patient's medical history, medications, allergies, and review of systems, she had been deemed to be an appropriate candidate for moderate sedation; she was therefore sedated with the medications listed above. The patient was monitored continuously with ECG tracing, pulse oximetry, blood pressure monitoring, and direct observations. A rectal examination was performed. The [id number] was inserted into the rectum and advanced under direct vision to the cecum, which was identified by the ileocecal valve and appendiceal orifice. The quality of the colonic preparation was excellent. A careful inspection was made as the colonoscope was withdrawn, including a retroflexed view of the rectum; findings and interventions are described below. Appropriate photodocumentation was obtained. ANUS: Anal exam reveals no masses or hemorrhoids, sphincter tone is normal.   RECTUM: Rectal exam reveals no masses or hemorrhoids. SIGMOID COLON: The mucosa is normal with good vascular pattern and without ulcers, diverticula, and polyps. DESCENDING COLON: The mucosa is normal with good vascular pattern and without ulcers, diverticula, and polyps.    SPLENIC FLEXURE: The splenic flexure is normal.   TRANSVERSE COLON: The mucosa is normal with good vascular pattern and without

## 2023-08-23 NOTE — PROGRESS NOTES
PT DAILY TREATMENT NOTE     Patient Name: Annabel Memory  Date:2018  : 1960  [x]  Patient  Verified  Payor:  / Plan: Pravin Swain 74 / Product Type:  /    In time: 11:01   Out time: 12:16  Total Treatment Time (min): 75  Total Timed Codes (min): 60  1:1 Treatment Time (min):   Visit #: 6 of     Treatment Area: Acute postoperative pain of right knee [G89.18, M25.561]    SUBJECTIVE  Pain Level (0-10 scale): 2-310   Any medication changes, allergies to medications, adverse drug reactions, diagnosis change, or new procedure performed?: [x] No    [] Yes (see summary sheet for update)  Subjective functional status/changes:   [] No changes reported  \"I walked 5 miles on Sat and , which I haven't done up til now; I've been stretching\"    OBJECTIVE  Modality rationale: decrease inflammation and decrease pain to improve the patients ability to perform functional mobility/ADLs and attain goals. Min Type Additional Details    [] Estim: []Att   []Unatt        []TENS instruct                  []IFC  []Premod   []NMES                     []Other:  []w/US   []w/ice   []w/heat  Position:  Location:    []  Traction: [] Cervical       []Lumbar                       [] Prone          []Supine                       []Intermittent   []Continuous Lbs:  [] before manual  [] after manual    []  Ultrasound: []Continuous   [] Pulsed                           []1MHz   []3MHz Location:  W/cm2:    []  Iontophoresis with dexamethasone         Location: [] Take home patch   [] In clinic   8 [x]  Ice     []  heat  []  Ice massage Position: reclined  Location: R knee   2; d/c due to p!  [x]  Vasopneumatic Device to the right knee Pressure:       [x] lo [] med [] hi   Temperature: [x] lo [] med [] hi   [x] Skin assessment post-treatment:  [x]intact []redness- no adverse reaction       []redness - adverse reaction:       50 min Therapeutic Exercise:  [x] See flow sheet :   Rationale: increase ROM and increase strength to improve the patients ability to perform functional mobility/ADLs and attain goals. 10 min Manual Therapy:   DTM lateral HS ; grade III tibiofemoral mobes ,PF glides; PROM knee flex/ext   Rationale:  decrease pain, increase ROM, increase tissue extensibility and decrease trigger points to perform functional mobility/ADLs and attain goals. x min Patient Education: [x] Review HEP    [] Progressed/Changed HEP based on:   [] positioning   [x] body mechanics   [] transfers   [] heat/ice application        Other Objective/Functional Measures:   -added SL/DL leg press and foam roll ITB  -progressed step ups to BOSU  -increased resistance with SAQ/SLR  -c/o pain with LP vaso; d/c and use of ice post treatment    Knee A/PROM post treatment: (-2)/(-1) to 116 degrees. Pain Level (0-10 scale) post treatment:  2-3    ASSESSMENT/Changes in Function:   Pt demonstrates appropriate progression of strength and ROM since SOC. Discussed decreasing frequency to 2 x/wk due to good compliance with HEP and anticipate pt will continue to progress appropriately towards goals at 2 x/wk. Good eccentric lowering from 6\" step. Reports fatigue towards end of reps with all exercises. Patient will continue to benefit from skilled PT services to modify and progress therapeutic interventions, address functional mobility deficits, address ROM deficits, address strength deficits and analyze and address soft tissue restrictions to attain remaining goals. Progress towards goals / Updated goals: · Short Term Goals: To be accomplished in  2-3  weeks:  1. Pt will be educated in appropriate HEP to decrease pain, increase ROM and return pt to PLOF. -PROGRESSING 5/23/18  2. Pt will increase R knee AROM 0 to 120 in order to promote normalized gait mechanics-PROGRESSING 0-105 in supine 5/25/18  3.  Pt will improve FOTO score to >/= 56 to demonstrate a significant improvement in functional activity tolerance.     · Long Term Goals: To be accomplished in  6-8  weeks:  1. Pt will improve FOTO score to >/= 66 to demo a significant improvement in functional activity tolerance. 2. Patient will demonstrate good eccentric quad control in lateral step down off 6 inch step with no compensation to facilitate normalized gait pattern for stair negotiation. Progressing able to do with good form today. 5-29-18  3. Patient will increase R knee strength in flexion to >/= 4+/5 so patient has improved ability to return to recreational sport activities. PLAN  [x]  Upgrade activities as tolerated     [x]  Continue plan of care  []  Update interventions per flow sheet       []  Discharge due to:_  []  Other:_      Noemi Sol, PT 5/30/2018  7:12 AM Statement Selected

## (undated) DEVICE — SNARE POLYP SM W13MMXL240CM SHTH DIA2.4MM OVL FLX DISP

## (undated) DEVICE — CANNULA CUSH AD W/ 14FT TBG

## (undated) DEVICE — CATHETER PH SUCT 14FR

## (undated) DEVICE — KENDALL RADIOLUCENT FOAM MONITORING ELECTRODE RECTANGULAR SHAPE: Brand: KENDALL

## (undated) DEVICE — SYRINGE MED 3ML CLR PLAS STD N CTRL LUERLOCK TIP DISP

## (undated) DEVICE — TUBING, SUCTION, 1/4" X 12', STRAIGHT: Brand: MEDLINE

## (undated) DEVICE — SYRINGE MED 5ML STD CLR PLAS LUERLOCK TIP N CTRL DISP

## (undated) DEVICE — SINGLE PORT MANIFOLD: Brand: NEPTUNE 2

## (undated) DEVICE — SOLUTION IV 1000ML 20MEQ K CHL IN 0.9% SOD CHL FLX CONT

## (undated) DEVICE — WRISTBAND ID AD W2.5XL9.5CM RED VYN ADH CLSR UNI-PRINT

## (undated) DEVICE — Device

## (undated) DEVICE — SYRINGE 50ML E/T

## (undated) DEVICE — BLUNTFILL: Brand: MONOJECT

## (undated) DEVICE — SET ADMIN 16ML TBNG L100IN 2 Y INJ SITE IV PIGGY BK DISP (ORDER IN MULIPLES OF 48)

## (undated) DEVICE — CATHETER IV 22GA L1IN BLU POLYUR STR HUB RADPQ PROTCT +

## (undated) DEVICE — TOURNIQUET PHLEB W1XL18IN BLU FLAT RL AND BND REUSE FOR IV